# Patient Record
Sex: FEMALE | Race: WHITE | NOT HISPANIC OR LATINO | Employment: OTHER | URBAN - METROPOLITAN AREA
[De-identification: names, ages, dates, MRNs, and addresses within clinical notes are randomized per-mention and may not be internally consistent; named-entity substitution may affect disease eponyms.]

---

## 2020-08-26 LAB
ALT SERPL-CCNC: 14 U/L (ref 6–29)
AST SERPL-CCNC: 22 U/L (ref 10–35)
CHOLESTEROL (EXTERNAL): 218 MG/DL
CREATININE (EXTERNAL): 0.79 MG/DL (ref 0.5–0.99)
GFR ESTIMATED (EXTERNAL): 76 ML/MIN/1.73M2
GFR ESTIMATED (IF AFRICAN AMERICAN) (EXTERNAL): 89 ML/MIN/1.73M2
GLUCOSE (EXTERNAL): 87 MG/DL (ref 65–99)
HBA1C MFR BLD: 5.3 %
HDLC SERPL-MCNC: 83 MG/DL
LDL CHOLESTEROL (EXTERNAL): 120 MG/DL
NON HDL CHOLESTEROL (EXTERNAL): 135 MG/DL
POTASSIUM (EXTERNAL): 4.4 MMOL/L (ref 3.5–5.3)
TRIGLYCERIDES (EXTERNAL): 54 MG/DL
TSH SERPL-ACNC: 5.03 MIU/L (ref 0.4–4.5)

## 2020-09-15 ENCOUNTER — TRANSFERRED RECORDS (OUTPATIENT)
Dept: HEALTH INFORMATION MANAGEMENT | Facility: CLINIC | Age: 70
End: 2020-09-15

## 2021-07-21 ENCOUNTER — TRANSFERRED RECORDS (OUTPATIENT)
Dept: HEALTH INFORMATION MANAGEMENT | Facility: CLINIC | Age: 71
End: 2021-07-21

## 2021-12-30 ENCOUNTER — TRANSFERRED RECORDS (OUTPATIENT)
Dept: HEALTH INFORMATION MANAGEMENT | Facility: CLINIC | Age: 71
End: 2021-12-30

## 2022-01-03 ENCOUNTER — TRANSFERRED RECORDS (OUTPATIENT)
Dept: INTERNAL MEDICINE | Facility: CLINIC | Age: 72
End: 2022-01-03

## 2022-02-16 ENCOUNTER — TRANSFERRED RECORDS (OUTPATIENT)
Dept: HEALTH INFORMATION MANAGEMENT | Facility: CLINIC | Age: 72
End: 2022-02-16

## 2022-02-16 LAB
ALT SERPL-CCNC: 19 U/L (ref 6–29)
AST SERPL-CCNC: 18 U/L (ref 10–35)
CHOLESTEROL (EXTERNAL): 214 MG/DL
CREATININE (EXTERNAL): 0.75 MG/DL (ref 0.6–0.93)
GFR ESTIMATED (EXTERNAL): 80 ML/MIN/1.73M2
GFR ESTIMATED (IF AFRICAN AMERICAN) (EXTERNAL): 93 ML/MIN/1.73M2
GLUCOSE (EXTERNAL): 76 MG/DL (ref 65–99)
HDLC SERPL-MCNC: 91 MG/DL
LDL CHOLESTEROL (EXTERNAL): 103 MG/DL
NON HDL CHOLESTEROL (EXTERNAL): 123 MG/DL
POTASSIUM (EXTERNAL): 4.6 MMOL/L (ref 3.5–5.3)
TRIGLYCERIDES (EXTERNAL): 100 MG/DL
TSH SERPL-ACNC: 0.13 MIU/L (ref 0.4–4.5)

## 2022-04-08 LAB
CREATININE (EXTERNAL): 0.8 MG/DL (ref 0.6–0.93)
GFR ESTIMATED (EXTERNAL): 74 ML/MIN/1.73M2
GFR ESTIMATED (IF AFRICAN AMERICAN) (EXTERNAL): 86 ML/MIN/1.73M2
GLUCOSE (EXTERNAL): 89 MG/DL (ref 65–139)
POTASSIUM (EXTERNAL): 4.7 MMOL/L (ref 3.5–5.3)

## 2022-04-22 LAB — TSH SERPL-ACNC: 1.86 MIU/L (ref 0.4–4.5)

## 2022-05-02 ENCOUNTER — TRANSFERRED RECORDS (OUTPATIENT)
Dept: HEALTH INFORMATION MANAGEMENT | Facility: CLINIC | Age: 72
End: 2022-05-02

## 2022-05-10 ENCOUNTER — TRANSFERRED RECORDS (OUTPATIENT)
Dept: HEALTH INFORMATION MANAGEMENT | Facility: CLINIC | Age: 72
End: 2022-05-10

## 2022-05-10 LAB
ALBUMIN (URINE) MG/SPEC: <0.2 MG/DL
ALT SERPL-CCNC: 22 U/L (ref 6–29)
AST SERPL-CCNC: 26 U/L (ref 10–35)
CHOLESTEROL (EXTERNAL): 241 MG/DL
CREATININE (EXTERNAL): 0.76 MG/DL (ref 0.6–0.93)
CREATININE (URINE): 21 MG/DL (ref 20–275)
GFR ESTIMATED (EXTERNAL): 79 ML/MIN/1.73M2
GFR ESTIMATED (IF AFRICAN AMERICAN) (EXTERNAL): 91 ML/MIN/1.73M2
GLUCOSE (EXTERNAL): 82 MG/DL (ref 65–99)
HDLC SERPL-MCNC: 92 MG/DL
LDL CHOLESTEROL (EXTERNAL): 134 MG/DL
NON HDL CHOLESTEROL (EXTERNAL): 149 MG/DL
POTASSIUM (EXTERNAL): 4.6 MMOL/L (ref 3.5–5.3)
TRIGLYCERIDES (EXTERNAL): 56 MG/DL
TSH SERPL-ACNC: 4.14 MIU/L (ref 0.4–4.5)

## 2022-05-25 ENCOUNTER — TRANSFERRED RECORDS (OUTPATIENT)
Dept: HEALTH INFORMATION MANAGEMENT | Facility: CLINIC | Age: 72
End: 2022-05-25

## 2022-05-29 ENCOUNTER — HEALTH MAINTENANCE LETTER (OUTPATIENT)
Age: 72
End: 2022-05-29

## 2022-06-04 ENCOUNTER — TELEPHONE ENCOUNTER (OUTPATIENT)
Dept: URBAN - METROPOLITAN AREA CLINIC 68 | Facility: CLINIC | Age: 72
End: 2022-06-04

## 2022-06-05 ENCOUNTER — TELEPHONE ENCOUNTER (OUTPATIENT)
Dept: URBAN - METROPOLITAN AREA CLINIC 68 | Facility: CLINIC | Age: 72
End: 2022-06-05

## 2022-06-25 ENCOUNTER — TELEPHONE ENCOUNTER (OUTPATIENT)
Age: 72
End: 2022-06-25

## 2022-06-26 ENCOUNTER — TELEPHONE ENCOUNTER (OUTPATIENT)
Age: 72
End: 2022-06-26

## 2022-07-08 NOTE — TELEPHONE ENCOUNTER
FUTURE VISIT INFORMATION      FUTURE VISIT INFORMATION:    Date: 9/13/22    Time: 12:20pm    Location: [facility]  REFERRAL INFORMATION:    Reason for visit/diagnosis  [place], wants to establish care, has macular degeneration per Pt. Will bring records in    RECORDS REQUESTED FROM:       Per pt- they will hand carry records

## 2022-07-12 ASSESSMENT — ENCOUNTER SYMPTOMS
MUSCLE WEAKNESS: 0
BOWEL INCONTINENCE: 0
MUSCLE CRAMPS: 0
JOINT SWELLING: 0
EYE REDNESS: 0
JAUNDICE: 0
CONSTIPATION: 1
BACK PAIN: 1
HEARTBURN: 0
EYE IRRITATION: 0
BLOATING: 0
DOUBLE VISION: 0
ARTHRALGIAS: 1
BLOOD IN STOOL: 0
RECTAL PAIN: 0
STIFFNESS: 0
EYE WATERING: 0
DIARRHEA: 0
NAUSEA: 0
VOMITING: 0
EYE PAIN: 0
MYALGIAS: 0
ABDOMINAL PAIN: 0
NECK PAIN: 1

## 2022-07-12 ASSESSMENT — ACTIVITIES OF DAILY LIVING (ADL)
IN_THE_PAST_7_DAYS,_DID_YOU_NEED_HELP_FROM_OTHERS_TO_PERFORM_EVERYDAY_ACTIVITIES_SUCH_AS_EATING,_GETTING_DRESSED,_GROOMING,_BATHING,_WALKING,_OR_USING_THE_TOILET: N
IN_THE_PAST_7_DAYS,_DID_YOU_NEED_HELP_FROM_OTHERS_TO_TAKE_CARE_OF_THINGS_SUCH_AS_LAUNDRY_AND_HOUSEKEEPING,_BANKING,_SHOPPING,_USING_THE_TELEPHONE,_FOOD_PREPARATION,_TRANSPORTATION,_OR_TAKING_YOUR_OWN_MEDICATIONS?: N

## 2022-07-19 ENCOUNTER — OFFICE VISIT (OUTPATIENT)
Dept: INTERNAL MEDICINE | Facility: CLINIC | Age: 72
End: 2022-07-19
Payer: MEDICARE

## 2022-07-19 VITALS
BODY MASS INDEX: 23.7 KG/M2 | DIASTOLIC BLOOD PRESSURE: 91 MMHG | HEIGHT: 60 IN | SYSTOLIC BLOOD PRESSURE: 142 MMHG | HEART RATE: 67 BPM | WEIGHT: 120.7 LBS | OXYGEN SATURATION: 97 %

## 2022-07-19 DIAGNOSIS — R91.8 PULMONARY NODULES: ICD-10-CM

## 2022-07-19 DIAGNOSIS — E03.9 HYPOTHYROIDISM, UNSPECIFIED TYPE: Primary | ICD-10-CM

## 2022-07-19 DIAGNOSIS — R03.0 ELEVATED BP WITHOUT DIAGNOSIS OF HYPERTENSION: ICD-10-CM

## 2022-07-19 DIAGNOSIS — E04.1 THYROID NODULE: ICD-10-CM

## 2022-07-19 DIAGNOSIS — Z71.84 TRAVEL ADVICE ENCOUNTER: ICD-10-CM

## 2022-07-19 DIAGNOSIS — N64.4 BREAST PAIN, LEFT: ICD-10-CM

## 2022-07-19 DIAGNOSIS — M26.609 TMJ (TEMPOROMANDIBULAR JOINT SYNDROME): ICD-10-CM

## 2022-07-19 DIAGNOSIS — K21.9 GASTROESOPHAGEAL REFLUX DISEASE WITHOUT ESOPHAGITIS: ICD-10-CM

## 2022-07-19 DIAGNOSIS — M81.0 AGE-RELATED OSTEOPOROSIS WITHOUT CURRENT PATHOLOGICAL FRACTURE: ICD-10-CM

## 2022-07-19 PROCEDURE — 99204 OFFICE O/P NEW MOD 45 MIN: CPT | Performed by: HOSPITALIST

## 2022-07-19 RX ORDER — NITROGLYCERIN 80 MG/1
1 PATCH TRANSDERMAL DAILY
COMMUNITY
End: 2023-05-16

## 2022-07-19 RX ORDER — AZITHROMYCIN 250 MG/1
TABLET, FILM COATED ORAL
Qty: 6 TABLET | Refills: 0 | Status: SHIPPED | OUTPATIENT
Start: 2022-07-19 | End: 2022-07-24

## 2022-07-19 RX ORDER — LEVOTHYROXINE SODIUM 88 UG/1
88 TABLET ORAL DAILY
COMMUNITY
End: 2023-06-13

## 2022-07-19 RX ORDER — MULTIPLE VITAMINS W/ MINERALS TAB 9MG-400MCG
1 TAB ORAL DAILY
COMMUNITY

## 2022-07-19 RX ORDER — ALENDRONATE SODIUM 70 MG/1
70 TABLET ORAL
COMMUNITY
End: 2022-08-31

## 2022-07-19 RX ORDER — MELOXICAM 7.5 MG/1
7.5 TABLET ORAL DAILY
COMMUNITY
End: 2022-11-07

## 2022-07-19 NOTE — NURSING NOTE
Marisa Vigil is a 72 year old female patient that presents today in clinic for the following:    Chief Complaint   Patient presents with     Establish Care     Questions about referrals, right hip and knee pain, colonoscopy questions, lung nodule questions     The patient's allergies and medications were reviewed as noted. A set of vitals were recorded as noted without incident. The patient does not have any other questions for the provider.    Harinder Cancino, EMT at 1:13 PM on 7/19/2022

## 2022-07-19 NOTE — PATIENT INSTRUCTIONS
- Keep your appointments with your [facility] doctors.   - Hold on check thyroid labs for about 1 month. Call [facility] lab to have an appointment.   - Continue takin levothyroxine without coffee.   - Referral to Endocrinology.  - Continue all other medications.   - May take Omeprazole or Famotidine over the counter if needed for heart burn or chest discomfort.     Follow up again in 6 month Dr. Conde

## 2022-07-24 PROBLEM — R91.8 PULMONARY NODULES: Status: ACTIVE | Noted: 2022-07-24

## 2022-07-24 PROBLEM — E04.1 THYROID NODULE: Status: ACTIVE | Noted: 2022-07-24

## 2022-07-24 PROBLEM — N64.4 BREAST PAIN, LEFT: Status: ACTIVE | Noted: 2022-07-24

## 2022-07-24 PROBLEM — M81.0 AGE-RELATED OSTEOPOROSIS WITHOUT CURRENT PATHOLOGICAL FRACTURE: Status: ACTIVE | Noted: 2022-07-24

## 2022-07-24 PROBLEM — E03.9 HYPOTHYROIDISM, UNSPECIFIED TYPE: Status: ACTIVE | Noted: 2022-07-24

## 2022-07-24 PROBLEM — M26.609 TMJ (TEMPOROMANDIBULAR JOINT SYNDROME): Status: ACTIVE | Noted: 2022-07-24

## 2022-07-24 PROBLEM — Z71.84 TRAVEL ADVICE ENCOUNTER: Status: ACTIVE | Noted: 2022-07-24

## 2022-07-24 PROBLEM — K21.9 GASTROESOPHAGEAL REFLUX DISEASE WITHOUT ESOPHAGITIS: Status: ACTIVE | Noted: 2022-07-24

## 2022-07-24 ASSESSMENT — ENCOUNTER SYMPTOMS
BACK PAIN: 1
NECK PAIN: 1
CHILLS: 0
DIARRHEA: 0
SHORTNESS OF BREATH: 0
SORE THROAT: 0
FREQUENCY: 0
NAUSEA: 0
ABDOMINAL PAIN: 0
FEVER: 0
HEARTBURN: 0
CONSTIPATION: 1
JOINT SWELLING: 0
MYALGIAS: 0
ARTHRALGIAS: 1
DYSURIA: 0

## 2022-07-25 NOTE — ASSESSMENT & PLAN NOTE
Recent lab with TSH at 4.14 on 5/11/22. Had been taking levothyroxine with coffee and just recently taking without coffee for a few days.   Continued on Levothyroxine 88mcg daily.   Plan on rechecking TSH in about 2 months.   Referral to Endocrine.

## 2022-07-25 NOTE — ASSESSMENT & PLAN NOTE
Had CT neck on 12/30/21 with note of 2-3 mm left apical nodule present. Patient has no history of smoking. CT chest was obtained on 1/3/22 showing no nodularity.   Plan on reevaluating with CT lung without contrast at about 12 months from prior CT.

## 2022-07-25 NOTE — ASSESSMENT & PLAN NOTE
Sees Dr. Stuart at [facility]. Recent lab for Vitamin D, 25-OH, D3 was 54 on 5/11/22. Continued on Alendronate 70mg weekly started on 7/17/22.   Prior hx of Prolia from 2/2018-3/2020.   Prior injection of Forteo but had facial swelling in 3/2022.

## 2022-07-25 NOTE — ASSESSMENT & PLAN NOTE
Hx of multinodules on thyroid. Repeat prior Endocrinologist from [place] (Dr. Mp Driver in [place], [place]) checked a US thyroid every 2 years, last was about 1 year ago. Interesting, there was not comment on patient's thyroid on recent CT neck on 12/2021.  Will refer to Endocrine to follow.

## 2022-07-25 NOTE — PROGRESS NOTES
Assessment/Plan  Problem List Items Addressed This Visit        Nervous and Auditory    Breast pain, left     Patient being followed up a [facility] in August.              Respiratory    Pulmonary nodules     Had CT neck on 12/30/21 with note of 2-3 mm left apical nodule present. Patient has no history of smoking. CT chest was obtained on 1/3/22 showing no nodularity.   Plan on reevaluating with CT lung without contrast at about 12 months from prior CT.               Digestive    Gastroesophageal reflux disease without esophagitis     Stable. Not on current medications. Hx of hiatal hernia.              Endocrine    Thyroid nodule     Hx of multinodules on thyroid. Repeat prior Endocrinologist from [place] (Dr. Mp Driver in [place], [place]) checked a US thyroid every 2 years, last was about 1 year ago. Interesting, there was not comment on patient's thyroid on recent CT neck on 12/2021.  Will refer to Endocrine to follow.            Relevant Medications    levothyroxine (SYNTHROID/LEVOTHROID) 88 MCG tablet    Other Relevant Orders    Adult Endocrinology  Referral    Hypothyroidism, unspecified type - Primary     Recent lab with TSH at 4.14 on 5/11/22. Had been taking levothyroxine with coffee and just recently taking without coffee for a few days.   Continued on Levothyroxine 88mcg daily.   Plan on rechecking TSH in about 2 months.   Referral to Endocrine.              Relevant Medications    levothyroxine (SYNTHROID/LEVOTHROID) 88 MCG tablet    Other Relevant Orders    TSH with free T4 reflex    Adult Endocrinology  Referral       Musculoskeletal and Integumentary    Age-related osteoporosis without current pathological fracture     Sees Dr. Stuart at [facility]. Recent lab for Vitamin D, 25-OH, D3 was 54 on 5/11/22. Continued on Alendronate 70mg weekly started on 7/17/22.   Prior hx of Prolia from 2/2018-3/2020.   Prior injection of Forteo but had facial swelling in 3/2022.            Relevant Medications     meloxicam (MOBIC) 7.5 MG tablet    calcium citrate and vitamin D (CITRACAL) 200-250 MG-UNIT TABS per tablet    alendronate (FOSAMAX) 70 MG tablet    TMJ (temporomandibular joint syndrome)     Continued on meloxicam 7.5mg daily.           Relevant Medications    meloxicam (MOBIC) 7.5 MG tablet       Other    Travel advice encounter     Will have Azithromycin available for respiratory symptoms developed during travel.           Relevant Medications    azithromycin (ZITHROMAX) 250 MG tablet    Elevated BP without diagnosis of hypertension     BP today is 142/91, will monitor for now.                 No results found for any visits on 07/19/22.    Health Maintenance Due   Topic Date Due     DEXA  Never done     ADVANCE CARE PLANNING  Never done     MAMMO SCREENING  Never done     COLORECTAL CANCER SCREENING  Never done     HEPATITIS C SCREENING  Never done     DTAP/TDAP/TD IMMUNIZATION (1 - Tdap) Never done     LIPID  Never done     MEDICARE ANNUAL WELLNESS VISIT  Never done     FALL RISK ASSESSMENT  Never done     PHQ-2 (once per calendar year)  Never done     COVID-19 Vaccine (4 - Booster for Pfizer series) 01/26/2022       Subjective  Patient mentions that she had moved here from [place], [place]. She had been getting some of her care from [place], [place] at [facility] for osteoporosis, and arthritis related to her left TMJ since about 2019. Patient mentions she was receiving most of her cares down in [place] for GERD with hiatal hernia, hypothyroid with multiple nodules. Mentions she does go back to [facility] in August as she has some left breast pains with swelling. Mentions a hx of right breast leak in the past but not now.     Mentions she had an AUDREY positivity in the past with joint pains and back disc disease about a year ago. Currently doing good with left jaw with meloxicam.    Does mentions that with thyroid nodules she was followed by her endocrinologist in [place] with an ultrasound every 2 years, last was about 1 year  ago.     Mentions she is being treated for osteoporosis with Alendronate.     Otherwise, she stays active and exercises during the week. She does golf. Mentions she does have family here and will be traveling with them on their trips. Upcoming trip on a cruise to [facility].       Review of Systems   Constitutional: Negative for chills and fever.   HENT: Negative for sore throat.    Respiratory: Negative for shortness of breath.    Cardiovascular: Negative for chest pain and peripheral edema.   Gastrointestinal: Positive for constipation. Negative for abdominal pain, diarrhea, heartburn and nausea.   Breasts:  Positive for tenderness. Negative for discharge.   Genitourinary: Negative for dysuria and frequency.   Musculoskeletal: Positive for arthralgias, back pain and neck pain. Negative for joint swelling and myalgias.   Skin: Negative for rash.   Psychiatric/Behavioral: Negative for mood changes.       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     rigth left arytenoid cartilage, paramedian displacement of left true vocal cord     Cataract      Gastroesophageal reflux disease without esophagitis      H/O degenerative disc disease      Hiatal hernia      Hyperlipidemia LDL goal <130      Non-toxic multinodular goiter      Osteoarthritis      Pulmonary nodules      TMJ (temporomandibular joint syndrome) 01/11/2022    right jaw       Past Surgical History:   Procedure Laterality Date     COLONOSCOPY      2005, 2014     deviated sep       EXCISION / BIOPSY BREAST / NIPPLE / DUCT  2013    right breast duct glands removed     EYE SURGERY  2020    left eyelid     HEART CATH LEFT HEART CATH  01/27/2021     LASIK  1998     FL BREAST AUGMENTATION  2014     FL FACE LIFT      partial     SURGICAL PATHOLOGY EXAM      left hip lipoma excision     WISDOM TOOTH EXTRACTION  1969       Family  History   Problem Relation Age of Onset     Breast Cancer Mother      Osteoporosis Mother      Heart Disease Father      Atrial fibrillation Father      Osteoporosis Paternal Grandmother        Social History     Tobacco Use     Smoking status: Never Smoker     Smokeless tobacco: Never Used   Substance Use Topics     Alcohol use: Yes     Alcohol/week: 1.0 standard drink     Types: 1 Standard drinks or equivalent per week     Comment: two or three times a month        Objective  BP (!) 142/91 (BP Location: Right arm, Patient Position: Sitting, Cuff Size: Adult Regular)   Pulse 67   Ht 1.524 m (5')   Wt 54.7 kg (120 lb 11.2 oz)   SpO2 97%   Breastfeeding No   BMI 23.57 kg/m    Vitals taken by Michael Conde MD    Physical Exam  Constitutional:       General: She is not in acute distress.     Appearance: She is not ill-appearing or toxic-appearing.   HENT:      Head: Normocephalic.      Mouth/Throat:      Mouth: Mucous membranes are moist.   Eyes:      Conjunctiva/sclera: Conjunctivae normal.   Cardiovascular:      Rate and Rhythm: Regular rhythm.      Heart sounds: Normal heart sounds. No murmur heard.    No friction rub. No gallop.   Pulmonary:      Effort: Pulmonary effort is normal. No respiratory distress.      Breath sounds: Normal breath sounds. No wheezing, rhonchi or rales.   Abdominal:      General: Abdomen is flat. Bowel sounds are normal. There is no distension.      Palpations: Abdomen is soft.      Tenderness: There is no abdominal tenderness.   Musculoskeletal:      Right lower leg: No edema.      Left lower leg: No edema.   Skin:     General: Skin is warm and dry.      Findings: No rash.   Neurological:      General: No focal deficit present.      Mental Status: She is alert.   Psychiatric:         Mood and Affect: Mood normal.         Thought Content: Thought content normal.         I spent greater than 50% of the 30 minutes in the visit coordinating care regarding patient's recommendations  towards establishing care    Return in about 6 months (around 1/19/2023).      Michael Conde MD  [facility] [place]

## 2022-07-29 NOTE — TELEPHONE ENCOUNTER
RECORDS RECEIVED FROM: internal /ce   DATE RECEIVED: 8/31/22    NOTES (FOR ALL VISITS) STATUS DETAILS   OFFICE NOTES from referring provider internal  Michael Conde MD   OFFICE NOTES from other specialist ce [facility]- 7/13/22 Dean Oneill [facility]- 8/24/21 [place]   2.22.21 [place]      MEDICATION LIST internal     IMAGING      DEXASCAN ce [facility]- 7/15/22      LABS     DIABETES: HBGA1C, CREATININE, FASTING LIPIDS, MICROALBUMIN URINE, POTASSIUM, TSH, T4, THYROID: TSH, T4, CBC, THYRODLONULIN, TOTAL T3, FREE T4, CALCITONIN, CEA internal /ce

## 2022-08-22 ENCOUNTER — TELEPHONE (OUTPATIENT)
Dept: INTERNAL MEDICINE | Facility: CLINIC | Age: 72
End: 2022-08-22

## 2022-08-22 NOTE — TELEPHONE ENCOUNTER
Reason for Call:  Other appointment    Detailed comments: patient wishes to cancel and not re schedule her appointment today with Elton Young at [facility].  Pos for covid; patient states no longer needed and saw another provider recently.      Thank you.    Phone Number Patient can be reached at: Other phone number:  na*    Best Time: na    Can we leave a detailed message on this number? na    Call taken on 8/22/2022 at 7:30 AM by Shayy Carrasco

## 2022-08-25 ASSESSMENT — ENCOUNTER SYMPTOMS
DIARRHEA: 0
SORE THROAT: 0
LEG PAIN: 1
NAUSEA: 0
JOINT SWELLING: 0
BLOOD IN STOOL: 0
STIFFNESS: 1
TROUBLE SWALLOWING: 0
MUSCLE CRAMPS: 0
NECK MASS: 0
LIGHT-HEADEDNESS: 0
EXERCISE INTOLERANCE: 0
JAUNDICE: 0
NECK PAIN: 0
VOMITING: 0
BLOATING: 1
RECTAL PAIN: 0
TASTE DISTURBANCE: 0
HYPERTENSION: 1
SINUS CONGESTION: 0
PALPITATIONS: 1
HOARSE VOICE: 1
BACK PAIN: 1
ORTHOPNEA: 0
SMELL DISTURBANCE: 0
BOWEL INCONTINENCE: 0
SYNCOPE: 0
ARTHRALGIAS: 1
SINUS PAIN: 0
MYALGIAS: 1
HEARTBURN: 1
HYPOTENSION: 0
ABDOMINAL PAIN: 1
CONSTIPATION: 0
SLEEP DISTURBANCES DUE TO BREATHING: 0
MUSCLE WEAKNESS: 1

## 2022-08-26 ENCOUNTER — TELEPHONE (OUTPATIENT)
Dept: PEDIATRICS | Facility: CLINIC | Age: 72
End: 2022-08-26

## 2022-08-26 ENCOUNTER — LAB (OUTPATIENT)
Dept: LAB | Facility: CLINIC | Age: 72
End: 2022-08-26
Payer: COMMERCIAL

## 2022-08-26 DIAGNOSIS — E03.9 HYPOTHYROIDISM, UNSPECIFIED TYPE: ICD-10-CM

## 2022-08-26 PROCEDURE — 36415 COLL VENOUS BLD VENIPUNCTURE: CPT

## 2022-08-26 PROCEDURE — 84443 ASSAY THYROID STIM HORMONE: CPT

## 2022-08-26 NOTE — PROGRESS NOTES
Reached out via phone to the pt on 8/26 to remind them of their visit with Dr. Lazcano.  Diagnosed with Covid on 8/17, no symptoms.  Naomie Campos      Marisa Vigil is a 72 year old female who is being evaluated via a billable video visit.        [facility]    Consulting provider: Michael Conde MD  [address] [place] [id number]    Reason for consultation: Hypothyroidism, thyroid nodule    HPI:   Marisa Vigil is a 72 year old-year-old woman who is coming here in regards to hypothyroidism, history of multinodular goiter and also been struggling with osteoporosis.    Patient concern:  Hx of multiple nodules on thyroid. Repeat prior Endocrinologist from [place] (Dr. Mp Driver in [place], [place]) checked a US thyroid every 2 years, last was about 1 year ago. Interesting, there was not comment on patient's thyroid on recent CT neck on 12/2021.  Patient had a thyroid biopsy several years ago.  This was without consequence.  Patient perceived the biopsy is very painful.    Hypothyroid since several years.  Initially diagnosed with symptoms of cold intolerance.    Due to Hashimoto's thyroiditis    Patient has been struggling with achiness all over her body.  Had problems with walking.  Had seen orthopedics, which did not reveal a clear diagnosis.  Has seen rheumatology without diagnosis.  Patient now is back to walking and is slowly improving      Thyroid medications: LT4 88 mcg daily, takes this in an a.m. on empty stomach with water food for 30 minutes    Symptoms of hypo- and hyperthyroidism:  Weight change increase; heat or cold intolerance cold; abnormal bowel movements constipation; blaoting no change in hair or skin pattern hair loss; anxiety   heart racing no; tremors no;   Symptoms in the anterior neck: dysphagia no; dysphonia no; pain no;    Exposure to radiation: no  FH of thyroid Cancer: no      Current Problem List:   Patient Active Problem List   Diagnosis     Pulmonary  nodules     Travel advice encounter     Thyroid nodule     Hypothyroidism, unspecified type     Age-related osteoporosis without current pathological fracture     TMJ (temporomandibular joint syndrome)     Gastroesophageal reflux disease without esophagitis     Breast pain, left     Elevated BP without diagnosis of hypertension         Assessment:  Hypothyroidism  Patient with some symptoms of hypothyroidism, however thyroid hormone levels have been in the normal range.  Continue for right now at L T4 88 mcg 6 days a week.  Would avoid overtreatment in this postmenopausal woman with osteoporosis    Multinodular goiter  On in office thyroid ultrasound today echo pattern is important in more 10 years with a small thyroid consistent with Hashimoto's thyroiditis, no nodules were present  Considering that patient had been followed for a number of years and had a prior biopsy this in itself is reassuring.  No further thyroid ultrasound would be indicated unless there is a significant clinical change.    Osteoporosis  Patient currently is seen at [facility] this upcoming visit in November 2022 with Dr. Avila.  She has been on Reclast Prolia, alendronate and teriparatide, she developed side effects with all of them  Patient would like to keep this appointment, however there after possibly switch and be followed by me.  This would make sense particularly if patient would be on a treatment with multiple injections.    Plan:   Follow-up with endocrinology for osteoporosis in November 2022  Overall patient can follow-up for hypothyroidism with her primary care provider, would continue on 88 mcg of levothyroxine which she takes 6 days a week and skips 1 day a week  No further thyroid ultrasound is indicated  If desired patient can follow-up with me in 6 months for osteoporosis    Simran Lazcano MD  Endocrinology and Diabetes  Telephone contact:  [facility] [place] [phone]  [facility]  [place] [phone]        Past Medical and Past Surgical History:  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     rigth left arytenoid cartilage, paramedian displacement of left true vocal cord     Cataract      Gastroesophageal reflux disease without esophagitis      H/O degenerative disc disease      Hiatal hernia      Hyperlipidemia LDL goal <130      Non-toxic multinodular goiter      Osteoarthritis      Pulmonary nodules      TMJ (temporomandibular joint syndrome) 01/11/2022    right jaw       Past Surgical History:   Procedure Laterality Date     COLONOSCOPY      2005, 2014     deviated sep       EXCISION / BIOPSY BREAST / NIPPLE / DUCT  2013    right breast duct glands removed     EYE SURGERY  2020    left eyelid     HEART CATH LEFT HEART CATH  01/27/2021    Normal     LASIK  1998     [place] BREAST AUGMENTATION  2014     [place] FACE LIFT      partial     SURGICAL PATHOLOGY EXAM      left hip lipoma excision     WISDOM TOOTH EXTRACTION  1969       Medications:   Current Outpatient Medications   Medication Sig Dispense Refill     alendronate (FOSAMAX) 70 MG tablet Take 70 mg by mouth every 7 days       calcium citrate and vitamin D (CITRACAL) 200-250 MG-UNIT TABS per tablet Take 1 tablet by mouth 2 times daily       levothyroxine (SYNTHROID/LEVOTHROID) 88 MCG tablet Take 88 mcg by mouth daily Every day but Sunday       meloxicam (MOBIC) 7.5 MG tablet Take 7.5 mg by mouth daily       multivitamin w/minerals (MULTI-VITAMIN) tablet Take 1 tablet by mouth daily       nitroGLYcerin (NITRO-DUR) 0.4 MG/HR 24 hr patch Place 1 patch onto the skin daily         Allergies:   Allergies   Allergen Reactions     Clindamycin Blisters     Vancomycin Rash     Versed [Midazolam] Dizziness     Pt reports memory issues     Forteo Other (See Comments)     Facial swelling on 3/2022       Social History      Tobacco Use     Smoking status: Never Smoker     Smokeless tobacco: Never Used   Substance Use Topics     Alcohol use: Yes     Alcohol/week: 1.0 standard drink     Types: 1 Standard drinks or equivalent per week     Comment: two or three times a month       Family History   Problem Relation Age of Onset     Breast Cancer Mother      Osteoporosis Mother      Heart Disease Father      Atrial fibrillation Father      Osteoporosis Paternal Grandmother          Physical Examination:  BP (!) 146/88 (BP Location: Left arm, Patient Position: Sitting, Cuff Size: Adult Regular)   Pulse 78   Wt 56.2 kg (123 lb 12.8 oz)   BMI 24.18 kg/m      Wt Readings from Last 4 Encounters:  07/19/22 : 54.7 kg (120 lb 11.2 oz)    General: Well appearing elderly petite woman in no distress, up and go quick  Eyes: EOMI. Sclerae and conjunctivae are clear.   HENT: No thyromegaly or mass.    Lymphatic: No cervical or supraclavicular lymphadenopathy.  Cardiovascular: RRR, with normal S1+S2 and no murmurs.   Skin: No rash or lesions. No abdominal striae.    Extremities: No peripheral edema.   Neurologic: No tremor with hands outstretched. 2+ patellar reflexes.     Labs and Studies:   Lab Results   Component Value Date    TSH 2.98 08/26/2022     1/3/2022 [place]  CT chest   Thyroid nodules not mentioned

## 2022-08-26 NOTE — TELEPHONE ENCOUNTER
Patient called stating that she contracted COVID on 8/17/22. She took a rapid COVID test this morning and saw a weak line on the test.     Patient states that all of her symptoms of COVID resolved. She has been fever free since 8/19/22.     Requested patient wear a well fitting facemask and keep her lab appointment.     Evelin Castaneda RN on 8/26/2022 at 10:06 AM

## 2022-08-27 LAB — TSH SERPL DL<=0.005 MIU/L-ACNC: 2.98 MU/L (ref 0.4–4)

## 2022-08-29 NOTE — TELEPHONE ENCOUNTER
FUTURE VISIT INFORMATION      FUTURE VISIT INFORMATION:    Date: 11/7/22    Time: 9:40am    Location: [facility]  REFERRAL INFORMATION:    Reason for visit/diagnosis  From [place], wants to establish care, has macular degeneration per Pt. Will bring records in    RECORDS REQUESTED FROM:       Per pt she will hand carry records

## 2022-08-31 ENCOUNTER — PRE VISIT (OUTPATIENT)
Dept: ENDOCRINOLOGY | Facility: CLINIC | Age: 72
End: 2022-08-31

## 2022-08-31 ENCOUNTER — OFFICE VISIT (OUTPATIENT)
Dept: ENDOCRINOLOGY | Facility: CLINIC | Age: 72
End: 2022-08-31
Attending: HOSPITALIST
Payer: MEDICARE

## 2022-08-31 VITALS
HEART RATE: 78 BPM | DIASTOLIC BLOOD PRESSURE: 88 MMHG | WEIGHT: 123.8 LBS | SYSTOLIC BLOOD PRESSURE: 146 MMHG | BODY MASS INDEX: 24.18 KG/M2

## 2022-08-31 DIAGNOSIS — E03.9 HYPOTHYROIDISM, UNSPECIFIED TYPE: ICD-10-CM

## 2022-08-31 DIAGNOSIS — E04.1 THYROID NODULE: ICD-10-CM

## 2022-08-31 PROCEDURE — 99204 OFFICE O/P NEW MOD 45 MIN: CPT | Performed by: INTERNAL MEDICINE

## 2022-08-31 ASSESSMENT — PAIN SCALES - GENERAL: PAINLEVEL: NO PAIN (0)

## 2022-08-31 NOTE — PATIENT INSTRUCTIONS
Continue on current Levothyroxine dose 88 mcg, 6 days a week, follow  up with you PCP Dr Conde in regards to hypothyroidism    No further thyroid ultrasound needed    Follow up with Dr Avila at [facility] for osteoporosis,    If desired see me in follow up for Osteoporosis after that

## 2022-08-31 NOTE — LETTER
8/31/2022       RE: Marisa Vigil  [address]   [address]  [facility] [address]     Dear Colleague,    Thank you for referring your patient, Marisa Vigil, to the [facility] [place] at [facility]. Please see a copy of my visit note below.    Reached out via phone to the pt on 8/26 to remind them of their visit with Dr. Lazcano.  Diagnosed with Covid on 8/17, no symptoms.  Naomie Campos      Marisa Vigil is a 72 year old female who is being evaluated via a billable video visit.        Endocrinology and Diabetes Clinic    Consulting provider: Michael Conde MD  [address] [place] [address]    Reason for consultation: Hypothyroidism, thyroid nodule    HPI:   Marisa Vigil is a 72 year old-year-old woman who is coming here in regards to hypothyroidism, history of multinodular goiter and also been struggling with osteoporosis.    Patient concern:  Hx of multiple nodules on thyroid. Repeat prior Endocrinologist from [place] (Dr. Mp Driver in [place], [place]) checked a US thyroid every 2 years, last was about 1 year ago. Interesting, there was not comment on patient's thyroid on recent CT neck on 12/2021.  Patient had a thyroid biopsy several years ago.  This was without consequence.  Patient perceived the biopsy is very painful.    Hypothyroid since several years.  Initially diagnosed with symptoms of cold intolerance.    Due to Hashimoto's thyroiditis    Patient has been struggling with achiness all over her body.  Had problems with walking.  Had seen orthopedics, which did not reveal a clear diagnosis.  Has seen rheumatology without diagnosis.  Patient now is back to walking and is slowly improving      Thyroid medications: LT4 88 mcg daily, takes this in an a.m. on empty stomach with water food for 30 minutes    Symptoms of hypo- and hyperthyroidism:  Weight change increase; heat or cold intolerance cold;  abnormal bowel movements constipation; blaoting no change in hair or skin pattern hair loss; anxiety   heart racing no; tremors no;   Symptoms in the anterior neck: dysphagia no; dysphonia no; pain no;    Exposure to radiation: no  FH of thyroid Cancer: no      Current Problem List:   Patient Active Problem List   Diagnosis     Pulmonary nodules     Travel advice encounter     Thyroid nodule     Hypothyroidism, unspecified type     Age-related osteoporosis without current pathological fracture     TMJ (temporomandibular joint syndrome)     Gastroesophageal reflux disease without esophagitis     Breast pain, left     Elevated BP without diagnosis of hypertension         Assessment:  Hypothyroidism  Patient with some symptoms of hypothyroidism, however thyroid hormone levels have been in the normal range.  Continue for right now at L T4 88 mcg 6 days a week.  Would avoid overtreatment in this postmenopausal woman with osteoporosis    Multinodular goiter  On in office thyroid ultrasound today echo pattern is important in more 10 years with a small thyroid consistent with Hashimoto's thyroiditis, no nodules were present  Considering that patient had been followed for a number of years and had a prior biopsy this in itself is reassuring.  No further thyroid ultrasound would be indicated unless there is a significant clinical change.    Osteoporosis  Patient currently is seen at [place] this upcoming visit in November 2022 with Dr. Avila.  She has been on Reclast Prolia, alendronate and teriparatide, she developed side effects with all of them  Patient would like to keep this appointment, however there after possibly switch and be followed by me.  This would make sense particularly if patient would be on a treatment with multiple injections.    Plan:   Follow-up with endocrinology for osteoporosis in November 2022  Overall patient can follow-up for hypothyroidism with her primary care provider, would continue on 88 mcg of  levothyroxine which she takes 6 days a week and skips 1 day a week  No further thyroid ultrasound is indicated  If desired patient can follow-up with me in 6 months for osteoporosis    Simran Lazcano MD  Endocrinology and Diabetes  Telephone contact:  [facility] [place] [phone]  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     rigth left arytenoid cartilage, paramedian displacement of left true vocal cord     Cataract      Gastroesophageal reflux disease without esophagitis      H/O degenerative disc disease      Hiatal hernia      Hyperlipidemia LDL goal <130      Non-toxic multinodular goiter      Osteoarthritis      Pulmonary nodules      TMJ (temporomandibular joint syndrome) 01/11/2022    right jaw       Past Surgical History:   Procedure Laterality Date     COLONOSCOPY      2005, 2014     deviated sep       EXCISION / BIOPSY BREAST / NIPPLE / DUCT  2013    right breast duct glands removed     EYE SURGERY  2020    left eyelid     HEART CATH LEFT HEART CATH  01/27/2021    Normal     LASIK  1998     NC BREAST AUGMENTATION  2014     NC FACE LIFT      partial     SURGICAL PATHOLOGY EXAM      left hip lipoma excision     WISDOM TOOTH EXTRACTION  1969       Medications:   Current Outpatient Medications   Medication Sig Dispense Refill     alendronate (FOSAMAX) 70 MG tablet Take 70 mg by mouth every 7 days       calcium citrate and vitamin D (CITRACAL) 200-250 MG-UNIT TABS per tablet Take 1 tablet by mouth 2 times daily       levothyroxine (SYNTHROID/LEVOTHROID) 88 MCG tablet Take 88 mcg by mouth daily Every day but Sunday       meloxicam (MOBIC) 7.5 MG tablet Take 7.5 mg by mouth daily       multivitamin w/minerals (MULTI-VITAMIN) tablet Take 1 tablet by mouth  daily       nitroGLYcerin (NITRO-DUR) 0.4 MG/HR 24 hr patch Place 1 patch onto the skin daily         Allergies:   Allergies   Allergen Reactions     Clindamycin Blisters     Vancomycin Rash     Versed [Midazolam] Dizziness     Pt reports memory issues     Forteo Other (See Comments)     Facial swelling on 3/2022       Social History     Tobacco Use     Smoking status: Never Smoker     Smokeless tobacco: Never Used   Substance Use Topics     Alcohol use: Yes     Alcohol/week: 1.0 standard drink     Types: 1 Standard drinks or equivalent per week     Comment: two or three times a month       Family History   Problem Relation Age of Onset     Breast Cancer Mother      Osteoporosis Mother      Heart Disease Father      Atrial fibrillation Father      Osteoporosis Paternal Grandmother          Physical Examination:  BP (!) 146/88 (BP Location: Left arm, Patient Position: Sitting, Cuff Size: Adult Regular)   Pulse 78   Wt 56.2 kg (123 lb 12.8 oz)   BMI 24.18 kg/m      Wt Readings from Last 4 Encounters:  07/19/22 : 54.7 kg (120 lb 11.2 oz)    General: Well appearing elderly petite woman in no distress, up and go quick  Eyes: EOMI. Sclerae and conjunctivae are clear.   HENT: No thyromegaly or mass.    Lymphatic: No cervical or supraclavicular lymphadenopathy.  Cardiovascular: RRR, with normal S1+S2 and no murmurs.   Skin: No rash or lesions. No abdominal striae.    Extremities: No peripheral edema.   Neurologic: No tremor with hands outstretched. 2+ patellar reflexes.     Labs and Studies:   Lab Results   Component Value Date    TSH 2.98 08/26/2022     1/3/2022 [place]  CT chest   Thyroid nodules not mentioned

## 2022-08-31 NOTE — NURSING NOTE
Chief Complaint   Patient presents with     Thyroid Problem     Vital signs:      BP: (!) 146/88 Pulse: 78             Weight: 56.2 kg (123 lb 12.8 oz)  Estimated body mass index is 24.18 kg/m  as calculated from the following:    Height as of 7/19/22: 1.524 m (5').    Weight as of this encounter: 56.2 kg (123 lb 12.8 oz).

## 2022-09-13 ENCOUNTER — PRE VISIT (OUTPATIENT)
Dept: OPHTHALMOLOGY | Facility: CLINIC | Age: 72
End: 2022-09-13

## 2022-09-16 ENCOUNTER — ANCILLARY PROCEDURE (OUTPATIENT)
Dept: ULTRASOUND IMAGING | Facility: CLINIC | Age: 72
End: 2022-09-16
Attending: HOSPITALIST
Payer: MEDICARE

## 2022-09-16 ENCOUNTER — LAB (OUTPATIENT)
Dept: LAB | Facility: CLINIC | Age: 72
End: 2022-09-16

## 2022-09-16 ENCOUNTER — OFFICE VISIT (OUTPATIENT)
Dept: INTERNAL MEDICINE | Facility: CLINIC | Age: 72
End: 2022-09-16
Payer: MEDICARE

## 2022-09-16 VITALS
BODY MASS INDEX: 23.66 KG/M2 | HEIGHT: 60 IN | OXYGEN SATURATION: 98 % | SYSTOLIC BLOOD PRESSURE: 153 MMHG | HEART RATE: 99 BPM | DIASTOLIC BLOOD PRESSURE: 125 MMHG | WEIGHT: 120.5 LBS

## 2022-09-16 DIAGNOSIS — R10.11 RUQ ABDOMINAL PAIN: Primary | ICD-10-CM

## 2022-09-16 DIAGNOSIS — R10.11 RUQ ABDOMINAL PAIN: ICD-10-CM

## 2022-09-16 DIAGNOSIS — R10.31 RLQ ABDOMINAL PAIN: ICD-10-CM

## 2022-09-16 DIAGNOSIS — K21.00 GASTROESOPHAGEAL REFLUX DISEASE WITH ESOPHAGITIS WITHOUT HEMORRHAGE: ICD-10-CM

## 2022-09-16 LAB
ALBUMIN SERPL BCG-MCNC: 4.7 G/DL (ref 3.5–5.2)
ALP SERPL-CCNC: 62 U/L (ref 35–104)
ALT SERPL W P-5'-P-CCNC: 15 U/L (ref 10–35)
ANION GAP SERPL CALCULATED.3IONS-SCNC: 12 MMOL/L (ref 7–15)
AST SERPL W P-5'-P-CCNC: 26 U/L (ref 10–35)
BILIRUB SERPL-MCNC: 0.7 MG/DL
BUN SERPL-MCNC: 11.6 MG/DL (ref 8–23)
CALCIUM SERPL-MCNC: 10.2 MG/DL (ref 8.8–10.2)
CHLORIDE SERPL-SCNC: 97 MMOL/L (ref 98–107)
CREAT SERPL-MCNC: 0.83 MG/DL (ref 0.51–0.95)
CRP SERPL-MCNC: <3 MG/L
DEPRECATED HCO3 PLAS-SCNC: 24 MMOL/L (ref 22–29)
ERYTHROCYTE [DISTWIDTH] IN BLOOD BY AUTOMATED COUNT: 12.4 % (ref 10–15)
GFR SERPL CREATININE-BSD FRML MDRD: 75 ML/MIN/1.73M2
GLUCOSE SERPL-MCNC: 99 MG/DL (ref 70–99)
HCT VFR BLD AUTO: 41.2 % (ref 35–47)
HGB BLD-MCNC: 13.7 G/DL (ref 11.7–15.7)
LIPASE SERPL-CCNC: 17 U/L (ref 13–60)
MCH RBC QN AUTO: 28.4 PG (ref 26.5–33)
MCHC RBC AUTO-ENTMCNC: 33.3 G/DL (ref 31.5–36.5)
MCV RBC AUTO: 86 FL (ref 78–100)
PLATELET # BLD AUTO: 268 10E3/UL (ref 150–450)
POTASSIUM SERPL-SCNC: 4.4 MMOL/L (ref 3.4–5.3)
PROT SERPL-MCNC: 7.3 G/DL (ref 6.4–8.3)
RBC # BLD AUTO: 4.82 10E6/UL (ref 3.8–5.2)
SODIUM SERPL-SCNC: 133 MMOL/L (ref 136–145)
WBC # BLD AUTO: 7.5 10E3/UL (ref 4–11)

## 2022-09-16 PROCEDURE — 86140 C-REACTIVE PROTEIN: CPT | Performed by: PATHOLOGY

## 2022-09-16 PROCEDURE — 85027 COMPLETE CBC AUTOMATED: CPT | Performed by: PATHOLOGY

## 2022-09-16 PROCEDURE — 36415 COLL VENOUS BLD VENIPUNCTURE: CPT | Performed by: PATHOLOGY

## 2022-09-16 PROCEDURE — 99214 OFFICE O/P EST MOD 30 MIN: CPT | Performed by: HOSPITALIST

## 2022-09-16 PROCEDURE — 80053 COMPREHEN METABOLIC PANEL: CPT | Performed by: PATHOLOGY

## 2022-09-16 PROCEDURE — 83690 ASSAY OF LIPASE: CPT | Performed by: PATHOLOGY

## 2022-09-16 PROCEDURE — 76705 ECHO EXAM OF ABDOMEN: CPT | Performed by: RADIOLOGY

## 2022-09-16 RX ORDER — OMEPRAZOLE 40 MG/1
40 CAPSULE, DELAYED RELEASE ORAL DAILY
Qty: 30 CAPSULE | Refills: 1 | Status: SHIPPED | OUTPATIENT
Start: 2022-09-16 | End: 2022-11-07

## 2022-09-16 RX ORDER — DICYCLOMINE HYDROCHLORIDE 10 MG/1
10 CAPSULE ORAL
Qty: 30 CAPSULE | Refills: 1 | Status: SHIPPED | OUTPATIENT
Start: 2022-09-16 | End: 2022-11-07

## 2022-09-16 ASSESSMENT — ENCOUNTER SYMPTOMS
HEARTBURN: 1
NAUSEA: 1
FREQUENCY: 0
FEVER: 0
CONSTIPATION: 0
CHILLS: 0
BACK PAIN: 1
SHORTNESS OF BREATH: 0
DYSURIA: 0
DIARRHEA: 0
ABDOMINAL PAIN: 1

## 2022-09-16 NOTE — ASSESSMENT & PLAN NOTE
- Obtain US of abdomen.   - Labs for CBC, CMP, CRP, Lipase  - Start on omeprazole 40mg daily for now.   - Trial of Dicyclomine 10mg with meals for muscle spasms.   - Remain on a low fat diet for now.   - May take Tylenol 650mg up to three times a day as needed.   - Will call patient about results once obtained for further plan.

## 2022-09-16 NOTE — PATIENT INSTRUCTIONS
- Obtain US of abdomen.   - Labs for CBC, CMP, CRP, Lipase  - Start on omeprazole 40mg daily for now.   - Trial of Dicyclomine 10mg with meals for muscle spasms.   - Remain on a low fat diet for now.   - May take Tylenol 650mg up to three times a day as needed.       Follow up again as needed but will let you know of results.     To schedule your Ultrasound with imaging, please call [phone]

## 2022-09-16 NOTE — PROGRESS NOTES
Assessment/Plan  Problem List Items Addressed This Visit        Nervous and Auditory    RUQ abdominal pain - Primary     - Obtain US of abdomen.   - Labs for CBC, CMP, CRP, Lipase  - Start on omeprazole 40mg daily for now.   - Trial of Dicyclomine 10mg with meals for muscle spasms.   - Remain on a low fat diet for now.   - May take Tylenol 650mg up to three times a day as needed.   - Will call patient about results once obtained for further plan.           Relevant Medications    dicyclomine (BENTYL) 10 MG capsule    Other Relevant Orders    US Abdomen Limited    Comprehensive metabolic panel    CBC with platelets    Lipase    CRP inflammation    RLQ abdominal pain     May have referred pain from RUQ pains. Obtaining US abdomen. If unrevealing, may consider CT abdomen/pelvis with contrast. Patient not reporting any diarrhea.   Trial of Dicyclomine 10mg with meals for muscle spasms.               Digestive    Gastroesophageal reflux disease with esophagitis without hemorrhage     Start on omeprazole 40mg daily for now.            Relevant Medications    omeprazole (PRILOSEC) 40 MG DR capsule          No results found for any visits on 09/16/22.    Health Maintenance Due   Topic Date Due     DEXA  Never done     ADVANCE CARE PLANNING  Never done     MAMMO SCREENING  Never done     COLORECTAL CANCER SCREENING  Never done     HEPATITIS C SCREENING  Never done     DTAP/TDAP/TD IMMUNIZATION (1 - Tdap) Never done     MEDICARE ANNUAL WELLNESS VISIT  Never done     FALL RISK ASSESSMENT  Never done     COVID-19 Vaccine (4 - Booster for Pfizer series) 01/26/2022     INFLUENZA VACCINE (1) 09/01/2022         Subjective  Patient mentions she went on her cruise and developed a COVID19 infection on 8/17. Mentions she feels she has recovered. Since her cruise however, she has been having right upper and right lower abdomen pains. She has been feeling heartburn also since her cruise trip. She did see ER at [facility] and labs for CBC,  BMP, LFT, UA were unrevealing. She denies having any fevers or diarrhea. Mentions pains are mainly dull but do become sharp at times. Had some nausea at times. Mentions also that she had right gluteal pains and saw Ortho, was told pains are likely not from her hip and was referred for physical therapy. No problems urinating. No rashes that she's noticed. This past Sunday she had no pains in the morning, she went to a game and had lots of junk food along with 2 drinks of alcohol which seemed to have worsened the pains. Has been having worsen pains after eating and felt like she's been kicked in the right lower back too. Does feel lots of bloating and has been taking gas X.       Review of Systems   Constitutional: Negative for chills and fever.   Respiratory: Negative for shortness of breath.    Cardiovascular: Negative for chest pain and peripheral edema.   Gastrointestinal: Positive for abdominal pain, heartburn and nausea. Negative for constipation and diarrhea.   Genitourinary: Negative for dysuria and frequency.   Musculoskeletal: Positive for back pain.   Skin: Negative for rash.       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     rigth left arytenoid cartilage, paramedian displacement of left true vocal cord     Cataract      Gastroesophageal reflux disease without esophagitis      H/O degenerative disc disease      Hiatal hernia      Hyperlipidemia LDL goal <130      Non-toxic multinodular goiter      Osteoarthritis      Pulmonary nodules      TMJ (temporomandibular joint syndrome) 01/11/2022    right jaw       Past Surgical History:   Procedure Laterality Date     COLONOSCOPY      2005, 2014     deviated sep       EXCISION / BIOPSY BREAST / NIPPLE / DUCT  2013    right breast duct glands removed     EYE SURGERY  2020    left eyelid     HEART CATH LEFT HEART CATH   01/27/2021    Normal     LASIK  1998     NC BREAST AUGMENTATION  2014     NC FACE LIFT      partial     SURGICAL PATHOLOGY EXAM      left hip lipoma excision     WISDOM TOOTH EXTRACTION  1969       Family History   Problem Relation Age of Onset     Breast Cancer Mother      Osteoporosis Mother      Heart Disease Father      Atrial fibrillation Father      Osteoporosis Paternal Grandmother        Social History     Tobacco Use     Smoking status: Never Smoker     Smokeless tobacco: Never Used   Substance Use Topics     Alcohol use: Yes     Alcohol/week: 1.0 standard drink     Types: 1 Standard drinks or equivalent per week     Comment: two or three times a month        Objective  BP (!) 153/125 (BP Location: Left arm, Patient Position: Sitting, Cuff Size: Adult Regular)   Pulse 99   Ht 1.524 m (5')   Wt 54.7 kg (120 lb 8 oz)   SpO2 98%   BMI 23.53 kg/m    Vitals taken by Michael Conde MD    Physical Exam  Constitutional:       General: She is in acute distress.      Appearance: She is not ill-appearing or toxic-appearing.   HENT:      Head: Normocephalic.   Eyes:      Conjunctiva/sclera: Conjunctivae normal.   Cardiovascular:      Rate and Rhythm: Regular rhythm.      Heart sounds: Normal heart sounds. No murmur heard.    No friction rub. No gallop.   Pulmonary:      Effort: Pulmonary effort is normal. No respiratory distress.      Breath sounds: Normal breath sounds. No wheezing, rhonchi or rales.   Abdominal:      General: Abdomen is flat. Bowel sounds are normal. There is no distension.      Palpations: Abdomen is soft.      Tenderness: There is abdominal tenderness. There is no guarding or rebound.      Comments: RUQ tenderness, RLQ and right inguinal tenderness. No obvious abdominal hernias present. No overlying rashes of abdomen or back.    Musculoskeletal:      Right lower leg: No edema.      Left lower leg: No edema.      Comments: Mild upper lumbar right perispinal tenderness.   Skin:      General: Skin is warm and dry.      Findings: No rash.   Neurological:      Mental Status: She is alert.   Psychiatric:         Mood and Affect: Mood normal.         Thought Content: Thought content normal.         I spent greater than 50% of the 20 minutes in the visit coordinating care regarding patient's recommendations towards right sided abdominal pain    Return if symptoms worsen or fail to improve.      Michael Conde MD  [facility] INTERNAL MEDICINE [place]

## 2022-09-16 NOTE — ASSESSMENT & PLAN NOTE
May have referred pain from RUQ pains. Obtaining US abdomen. If unrevealing, may consider CT abdomen/pelvis with contrast. Patient not reporting any diarrhea.   Trial of Dicyclomine 10mg with meals for muscle spasms.

## 2022-09-16 NOTE — NURSING NOTE
Marisa Vigil is a 72 year old female patient that presents today in clinic for the following:    Chief Complaint   Patient presents with     Back Pain     RIGHT SIDE BACK PAIN, pain in abdomen as well; depending on what pt eats, pain can increase      The patient's allergies and medications were reviewed as noted. A set of vitals were recorded as noted without incident: BP (!) 153/125 (BP Location: Left arm, Patient Position: Sitting, Cuff Size: Adult Regular)   Pulse 99   Ht 1.524 m (5')   Wt 54.7 kg (120 lb 8 oz)   SpO2 98%   BMI 23.53 kg/m  . The patient does not have any other questions for the provider.    Whitney Sow, EMT at 2:00 PM on 9/16/2022

## 2022-09-19 ENCOUNTER — MYC MEDICAL ADVICE (OUTPATIENT)
Dept: INTERNAL MEDICINE | Facility: CLINIC | Age: 72
End: 2022-09-19

## 2022-09-19 DIAGNOSIS — R10.11 RUQ ABDOMINAL PAIN: Primary | ICD-10-CM

## 2022-09-19 DIAGNOSIS — R10.31 RLQ ABDOMINAL PAIN: ICD-10-CM

## 2022-09-19 NOTE — TELEPHONE ENCOUNTER
CT abdomen from [facility] on 8/21/22 was normal. If she is feeling better, we can cancel the appointment for her CT scan ordered.       Michael Conde MD  Internal Medicine  Primary Care Clinic, [place], [place]

## 2022-10-03 ENCOUNTER — HEALTH MAINTENANCE LETTER (OUTPATIENT)
Age: 72
End: 2022-10-03

## 2022-11-03 ENCOUNTER — TRANSFERRED RECORDS (OUTPATIENT)
Dept: HEALTH INFORMATION MANAGEMENT | Facility: CLINIC | Age: 72
End: 2022-11-03

## 2022-11-07 ENCOUNTER — PRE VISIT (OUTPATIENT)
Dept: OPHTHALMOLOGY | Facility: CLINIC | Age: 72
End: 2022-11-07

## 2022-11-07 ENCOUNTER — OFFICE VISIT (OUTPATIENT)
Dept: OPHTHALMOLOGY | Facility: CLINIC | Age: 72
End: 2022-11-07
Payer: MEDICARE

## 2022-11-07 DIAGNOSIS — Z98.890 S/P LASIK (LASER ASSISTED IN SITU KERATOMILEUSIS): ICD-10-CM

## 2022-11-07 DIAGNOSIS — H02.883 MEIBOMIAN GLAND DYSFUNCTION (MGD) OF BOTH EYES: ICD-10-CM

## 2022-11-07 DIAGNOSIS — H25.13 NUCLEAR SCLEROTIC CATARACT OF BOTH EYES: Primary | ICD-10-CM

## 2022-11-07 DIAGNOSIS — H02.886 MEIBOMIAN GLAND DYSFUNCTION (MGD) OF BOTH EYES: ICD-10-CM

## 2022-11-07 DIAGNOSIS — H52.13 MYOPIA, BILATERAL: ICD-10-CM

## 2022-11-07 PROCEDURE — 92014 COMPRE OPH EXAM EST PT 1/>: CPT | Performed by: OPHTHALMOLOGY

## 2022-11-07 PROCEDURE — 92015 DETERMINE REFRACTIVE STATE: CPT | Mod: GY | Performed by: OPHTHALMOLOGY

## 2022-11-07 RX ORDER — NEOMYCIN SULFATE, POLYMYXIN B SULFATE, AND DEXAMETHASONE 3.5; 10000; 1 MG/G; [USP'U]/G; MG/G
0.5 OINTMENT OPHTHALMIC AT BEDTIME
Qty: 3.5 G | Refills: 3 | Status: SHIPPED | OUTPATIENT
Start: 2022-11-07 | End: 2022-11-14

## 2022-11-07 ASSESSMENT — CONF VISUAL FIELD
OS_INFERIOR_NASAL_RESTRICTION: 0
OS_SUPERIOR_NASAL_RESTRICTION: 0
OS_NORMAL: 1
OD_SUPERIOR_TEMPORAL_RESTRICTION: 0
OS_INFERIOR_TEMPORAL_RESTRICTION: 0
OD_SUPERIOR_NASAL_RESTRICTION: 0
OD_INFERIOR_NASAL_RESTRICTION: 0
OS_SUPERIOR_TEMPORAL_RESTRICTION: 0
OD_NORMAL: 1
OD_INFERIOR_TEMPORAL_RESTRICTION: 0
METHOD: COUNTING FINGERS

## 2022-11-07 ASSESSMENT — VISUAL ACUITY
OS_CC: 20/20
OD_CC: 20/20
CORRECTION_TYPE: GLASSES
METHOD: SNELLEN - LINEAR
OS_CC+: -3

## 2022-11-07 ASSESSMENT — REFRACTION_WEARINGRX
OS_CYLINDER: SPHERE
OS_SPHERE: -2.00
OD_CYLINDER: +0.25
OD_AXIS: 178
OD_ADD: +2.50
OS_ADD: +2.50
OD_SPHERE: -1.25

## 2022-11-07 ASSESSMENT — SLIT LAMP EXAM - LIDS
COMMENTS: MEIBOMIAN GLAND DYSFUNCTION
COMMENTS: MEIBOMIAN GLAND DYSFUNCTION

## 2022-11-07 ASSESSMENT — REFRACTION_MANIFEST
OD_SPHERE: -1.25
OS_SPHERE: -1.75
OD_AXIS: 178
OS_ADD: +2.50
OD_CYLINDER: +0.50
OD_ADD: +2.50
OS_CYLINDER: SPHERE

## 2022-11-07 ASSESSMENT — EXTERNAL EXAM - LEFT EYE: OS_EXAM: NORMAL

## 2022-11-07 ASSESSMENT — TONOMETRY
IOP_METHOD: ICARE
OS_IOP_MMHG: 11
OD_IOP_MMHG: 11

## 2022-11-07 ASSESSMENT — CUP TO DISC RATIO
OS_RATIO: 0.25
OD_RATIO: 0.3

## 2022-11-07 ASSESSMENT — EXTERNAL EXAM - RIGHT EYE: OD_EXAM: NORMAL

## 2022-11-07 NOTE — NURSING NOTE
Chief Complaints and History of Present Illnesses   Patient presents with     Annual Eye Exam     Chief Complaint(s) and History of Present Illness(es)     Annual Eye Exam            Laterality: both eyes    Associated symptoms: Negative for dryness, eye pain, flashes and floaters    Pain scale: 0/10          Comments    Patient present to establish care. Patient states lids bug her but since using lid scrubs they have improved. Patient denies vision issues at this time. Patient concerned about cataracts.     MARIE Robison November 7, 2022 9:44 AM

## 2022-11-07 NOTE — PROGRESS NOTES
HPI  Marisa Vigil is a 72 year old female here for comprehensive eye exam.  Recently moved to [place].  Vision is stable.  She is wondering how her cataracts are doing.  She is using lid scrubs for eye irritation.       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     rigth left arytenoid cartilage, paramedian displacement of left true vocal cord     Cataract      Gastroesophageal reflux disease without esophagitis      H/O degenerative disc disease      Hiatal hernia      Hyperlipidemia LDL goal <130      Non-toxic multinodular goiter      Osteoarthritis      Pulmonary nodules      TMJ (temporomandibular joint syndrome) 01/11/2022    right jaw      POH: Glasses for myopia, then laser in situ keratomileusis (LASIK) both eyes Monovision  no trauma  Left eyelid chalazion excision  Bilateral lower eyelids blephararoplasty  Meibomian gland dysfunction     Oc Meds: maxitrol ointment q bedtime pulsed in the past   Refresh daily as needed   FH: father had glaucoma, father had age related macular degeneration, or other known eye diseases         Assessment & Plan        1. Nuclear sclerotic cataract of both eyes - Both Eyes    2. Myopia, bilateral - Both Eyes    3. Meibomian gland dysfunction (MGD) of both eyes - Both Eyes    4. S/P LASIK (laser assisted in situ keratomileusis) - Both Eyes       (H52.13) Myopia, bilateral - Both Eyes (primary encounter diagnosis)  Comment: mild change , shift status-post laser in situ keratomileusis (LASIK) with mini monovision  Plan: Refraction done and prescription for glasses given     Cataracts with early visual significance, counseled patient that it could cause mild glare/halos and refractive changes over time that can be compensated with new glasses   Plan: new prescription given, observe      Add warm compresses prior to lid scrubs for  Meibomian gland dysfunction.    -----------------------------------------------------------------------------------       Patient disposition:   Return in about 1 year (around 11/7/2023) for Comprehensive Exam. or patient to call sooner as needed.      Complete documentation of historical and exam elements from today's encounter can be found in the full encounter summary report (not reduplicated in this progress note). I personally obtained the chief complaint(s) and history of present illness.  I have confirmed and edited as necessary the CC, HPI, PMH/PSH, social history, FMH, ROS, and exam/neuro findings as obtained by the technician or others. I have examined this patient myself and I personally viewed the image(s) and studies listed above and the documentation reflects my findings and interpretation.  I formulated and edited as necessary the assessment and plan and discussed the findings and management plan with the patient and family.     Diya Chávez MD

## 2022-11-17 ENCOUNTER — TRANSFERRED RECORDS (OUTPATIENT)
Dept: HEALTH INFORMATION MANAGEMENT | Facility: CLINIC | Age: 72
End: 2022-11-17

## 2022-12-01 ENCOUNTER — TRANSFERRED RECORDS (OUTPATIENT)
Dept: HEALTH INFORMATION MANAGEMENT | Facility: CLINIC | Age: 72
End: 2022-12-01

## 2022-12-12 ENCOUNTER — TRANSFERRED RECORDS (OUTPATIENT)
Dept: HEALTH INFORMATION MANAGEMENT | Facility: CLINIC | Age: 72
End: 2022-12-12

## 2023-01-09 ENCOUNTER — TELEPHONE (OUTPATIENT)
Dept: PEDIATRICS | Facility: CLINIC | Age: 73
End: 2023-01-09

## 2023-01-09 ENCOUNTER — OFFICE VISIT (OUTPATIENT)
Dept: PEDIATRICS | Facility: CLINIC | Age: 73
End: 2023-01-09
Payer: MEDICARE

## 2023-01-09 ENCOUNTER — OFFICE VISIT (OUTPATIENT)
Dept: PEDIATRICS | Facility: CLINIC | Age: 73
End: 2023-01-09
Attending: INTERNAL MEDICINE
Payer: MEDICARE

## 2023-01-09 ENCOUNTER — HOSPITAL ENCOUNTER (OUTPATIENT)
Dept: ULTRASOUND IMAGING | Facility: CLINIC | Age: 73
Discharge: HOME | End: 2023-01-09
Attending: PHYSICIAN ASSISTANT | Admitting: PHYSICIAN ASSISTANT
Payer: MEDICARE

## 2023-01-09 VITALS
WEIGHT: 124 LBS | BODY MASS INDEX: 24.22 KG/M2 | OXYGEN SATURATION: 98 % | DIASTOLIC BLOOD PRESSURE: 94 MMHG | SYSTOLIC BLOOD PRESSURE: 176 MMHG | HEART RATE: 69 BPM | RESPIRATION RATE: 18 BRPM | TEMPERATURE: 97.3 F

## 2023-01-09 VITALS
TEMPERATURE: 97.9 F | DIASTOLIC BLOOD PRESSURE: 72 MMHG | SYSTOLIC BLOOD PRESSURE: 140 MMHG | BODY MASS INDEX: 24.35 KG/M2 | WEIGHT: 124 LBS | RESPIRATION RATE: 16 BRPM | OXYGEN SATURATION: 99 % | HEART RATE: 74 BPM | HEIGHT: 60 IN

## 2023-01-09 DIAGNOSIS — R19.5 CLAY-COLORED STOOLS: ICD-10-CM

## 2023-01-09 DIAGNOSIS — R10.11 RUQ ABDOMINAL PAIN: Primary | ICD-10-CM

## 2023-01-09 DIAGNOSIS — Z87.442 HISTORY OF KIDNEY STONES: ICD-10-CM

## 2023-01-09 DIAGNOSIS — R10.13 EPIGASTRIC PAIN: Primary | ICD-10-CM

## 2023-01-09 DIAGNOSIS — R10.11 RUQ ABDOMINAL PAIN: ICD-10-CM

## 2023-01-09 LAB
ALBUMIN SERPL BCG-MCNC: 4.8 G/DL (ref 3.5–5.2)
ALBUMIN UR-MCNC: NEGATIVE MG/DL
ALP SERPL-CCNC: 65 U/L (ref 35–104)
ALT SERPL W P-5'-P-CCNC: 22 U/L (ref 10–35)
ANION GAP SERPL CALCULATED.3IONS-SCNC: 13 MMOL/L (ref 7–15)
APPEARANCE UR: CLEAR
AST SERPL W P-5'-P-CCNC: 30 U/L (ref 10–35)
BASOPHILS # BLD AUTO: 0 10E3/UL (ref 0–0.2)
BASOPHILS NFR BLD AUTO: 1 %
BILIRUB SERPL-MCNC: 0.9 MG/DL
BILIRUB UR QL STRIP: NEGATIVE
BUN SERPL-MCNC: 10.3 MG/DL (ref 8–23)
CALCIUM SERPL-MCNC: 9.6 MG/DL (ref 8.8–10.2)
CHLORIDE SERPL-SCNC: 100 MMOL/L (ref 98–107)
COLOR UR AUTO: ABNORMAL
CREAT SERPL-MCNC: 0.79 MG/DL (ref 0.51–0.95)
CRP SERPL-MCNC: <3 MG/L
DEPRECATED HCO3 PLAS-SCNC: 25 MMOL/L (ref 22–29)
EOSINOPHIL # BLD AUTO: 0 10E3/UL (ref 0–0.7)
EOSINOPHIL NFR BLD AUTO: 1 %
ERYTHROCYTE [DISTWIDTH] IN BLOOD BY AUTOMATED COUNT: 12.8 % (ref 10–15)
ERYTHROCYTE [SEDIMENTATION RATE] IN BLOOD BY WESTERGREN METHOD: 6 MM/HR (ref 0–30)
GFR SERPL CREATININE-BSD FRML MDRD: 80 ML/MIN/1.73M2
GLUCOSE SERPL-MCNC: 96 MG/DL (ref 70–99)
GLUCOSE UR STRIP-MCNC: NEGATIVE MG/DL
HCT VFR BLD AUTO: 43.7 % (ref 35–47)
HGB BLD-MCNC: 13.7 G/DL (ref 11.7–15.7)
HGB UR QL STRIP: NEGATIVE
IMM GRANULOCYTES # BLD: 0 10E3/UL
IMM GRANULOCYTES NFR BLD: 0 %
KETONES UR STRIP-MCNC: 10 MG/DL
LEUKOCYTE ESTERASE UR QL STRIP: NEGATIVE
LIPASE SERPL-CCNC: 17 U/L (ref 13–60)
LYMPHOCYTES # BLD AUTO: 2.5 10E3/UL (ref 0.8–5.3)
LYMPHOCYTES NFR BLD AUTO: 30 %
MCH RBC QN AUTO: 28.5 PG (ref 26.5–33)
MCHC RBC AUTO-ENTMCNC: 31.4 G/DL (ref 31.5–36.5)
MCV RBC AUTO: 91 FL (ref 78–100)
MONOCYTES # BLD AUTO: 0.5 10E3/UL (ref 0–1.3)
MONOCYTES NFR BLD AUTO: 6 %
NEUTROPHILS # BLD AUTO: 5.3 10E3/UL (ref 1.6–8.3)
NEUTROPHILS NFR BLD AUTO: 62 %
NITRATE UR QL: NEGATIVE
NRBC # BLD AUTO: 0 10E3/UL
NRBC BLD AUTO-RTO: 0 /100
PH UR STRIP: 5.5 [PH] (ref 5–7)
PLATELET # BLD AUTO: 264 10E3/UL (ref 150–450)
POTASSIUM SERPL-SCNC: 4 MMOL/L (ref 3.4–5.3)
PROT SERPL-MCNC: 7.3 G/DL (ref 6.4–8.3)
RBC # BLD AUTO: 4.81 10E6/UL (ref 3.8–5.2)
RBC URINE: 0 /HPF
SODIUM SERPL-SCNC: 138 MMOL/L (ref 136–145)
SP GR UR STRIP: 1.01 (ref 1–1.03)
SQUAMOUS EPITHELIAL: <1 /HPF
UROBILINOGEN UR STRIP-MCNC: NORMAL MG/DL
WBC # BLD AUTO: 8.4 10E3/UL (ref 4–11)
WBC URINE: <1 /HPF

## 2023-01-09 PROCEDURE — 76700 US EXAM ABDOM COMPLETE: CPT

## 2023-01-09 PROCEDURE — 99214 OFFICE O/P EST MOD 30 MIN: CPT | Performed by: INTERNAL MEDICINE

## 2023-01-09 PROCEDURE — 36415 COLL VENOUS BLD VENIPUNCTURE: CPT | Performed by: PHYSICIAN ASSISTANT

## 2023-01-09 PROCEDURE — 83690 ASSAY OF LIPASE: CPT | Performed by: PHYSICIAN ASSISTANT

## 2023-01-09 PROCEDURE — 86140 C-REACTIVE PROTEIN: CPT | Performed by: PHYSICIAN ASSISTANT

## 2023-01-09 PROCEDURE — 81001 URINALYSIS AUTO W/SCOPE: CPT | Performed by: PHYSICIAN ASSISTANT

## 2023-01-09 PROCEDURE — 85652 RBC SED RATE AUTOMATED: CPT | Performed by: PHYSICIAN ASSISTANT

## 2023-01-09 PROCEDURE — 80053 COMPREHEN METABOLIC PANEL: CPT | Performed by: PHYSICIAN ASSISTANT

## 2023-01-09 PROCEDURE — 85025 COMPLETE CBC W/AUTO DIFF WBC: CPT | Performed by: PHYSICIAN ASSISTANT

## 2023-01-09 PROCEDURE — 99203 OFFICE O/P NEW LOW 30 MIN: CPT | Performed by: PHYSICIAN ASSISTANT

## 2023-01-09 ASSESSMENT — PAIN SCALES - GENERAL: PAINLEVEL: MILD PAIN (3)

## 2023-01-09 NOTE — RESULT ENCOUNTER NOTE
Results discussed directly with patient while patient was present. Any further details documented in the note.   Naomy Walden PA-C

## 2023-01-09 NOTE — PROGRESS NOTES
Assessment & Plan     RUQ abdominal pain  Classic presentation of biliary colic vs cholecystitis - onset last night and severe pain (8/10) lasting over an hour and subsiding on own after a large heavy meal (fried chicken, fries, and ice cream).  No nausea or vomiting.  No fever.  Pain is better, but pt is still sore in the RUQ region with tenderness on palpation.    Will send to day of diagnosis hub for imaging and labs today and referral to surgery if needed.           There are no Patient Instructions on file for this visit.    Return in about 1 week (around 1/16/2023), or if symptoms worsen or fail to improve.    Sanjay Wood MD  [facility] NOY Cunningham is a 72 year old, presenting for the following health issues:  Abdominal Pain (Gallbladder )    Patient is going out of [place] country [place] so she wants to make sure everything is okay. Pain started last night at 8pm and 8 for pain level and this morning she reports that she is feeling better. No nausea, vomiting or fever. Patient reports that her back hurt and she was bloated last night.    Could be what she ate.  Had fried chicken, french fries, and an ice cream bar.    History of Present Illness       Reason for visit:  Gallbladder  Symptom onset:  1-3 days ago  Symptoms include:  Pain R side abdomen  Symptom intensity:  Mild  Symptom progression:  Improving  Had these symptoms before:  No    She eats 2-3 servings of fruits and vegetables daily.She consumes 0 sweetened beverage(s) daily.She exercises with enough effort to increase her heart rate 30 to 60 minutes per day.  She exercises with enough effort to increase her heart rate 6 days per week.   She is taking medications regularly.     Referral to Acute and Diagnostic Services    The [facility]ostics [facility] Clinic has been contacted at [phone] ([place]) to confirm patient acceptance. The transition to Acute & Diagnostic Services Clinic has  been discussed with patient, and she agrees with next level of care.  Patient understands that evaluation/treatment at [facility] typically takes significantly longer than in clinic/urgent care (>2 hours).      Special issues:  None          Referral placed: Yes  Patient has transportation arranged and will travel to the [facility] without delay: Yes  Patient aware not to eat or drink. Yes    The following provider has assessed this patient for intervention at [facility], and directed the patient for referral: MD Sanjay Winter MD              Review of Systems   All other systems on a 10-point review are negative, unless otherwise noted in HPI        Objective    BP (!) 140/72 (BP Location: Right arm, Patient Position: Sitting, Cuff Size: Adult Regular)   Pulse 74   Temp 97.9  F (36.6  C) (Tympanic)   Resp 16   Ht 1.524 m (5')   Wt 56.2 kg (124 lb)   SpO2 99%   BMI 24.22 kg/m    Body mass index is 24.22 kg/m .  Physical Exam   GENERAL: healthy, alert and no distress  ABDOMEN: tenderness RUQ and bowel sounds normal  MS: no gross musculoskeletal defects noted, no edema

## 2023-01-09 NOTE — PROGRESS NOTES
Assessment & Plan     Epigastric pain  RUQ abdominal pain  Kahlil-colored stools  History of kidney stones  Stat ultrasound and labs reassuring against acute process.  Patient had good response to GI cocktail.  Suspect gastritis as etiology.  Recommend omeprazole 20 mg twice daily x14 days.  If symptoms worsening or not improving follow-up with general surgery to evaluate suspected biliary colic.  If incomplete resolution after completion of 2 weeks of omeprazole follow-up with PCP to discuss H. pylori testing  - Adult General Surg Referral  - Referral to Acute and Diagnostic Services (Day of diagnostic / First order acute)  - US Abdomen Complete  - Comprehensive metabolic panel  - CBC with platelets differential  - Lipase  - Erythrocyte sedimentation rate auto  - CRP inflammation  - UA with Microscopic reflex to Culture  - lidocaine (viscous) (XYLOCAINE) 2 % 15 mL, alum & mag hydroxide-simethicone (MAALOX) 15 mL GI Cocktail  - omeprazole (PRILOSEC) 20 MG DR capsule  Dispense: 28 capsule; Refill: 0              44 minutes spent on the date of the encounter doing chart review, history and exam, documentation and further activities per the note        Return in about 2 weeks (around 1/23/2023) for consultation with general surgery if no improvent or incomplete improvement.    [facility] ALONZO Cunningham is a 72 year old, presenting for the following health issues:  Abdominal Pain (RUQ pain X 2 days)      HPI     Abdominal/Flank Pain  Onset/Duration: X 2 days  Description:   Character: Stabbing  Location: right upper quadrant  Radiation: Back  Intensity: 2/10  Progression of Symptoms:  improving  Accompanying Signs & Symptoms:  Fever/Chills: no   Gas/Bloating: YES- bloating during episodic pain  Nausea: no   Vomiting: no   Diarrhea: no   Constipation: no   Dysuria or Hematuria: no   History:   Trauma: no   Previous similar pain: no   Previous tests done: no  "  Previous Abdominal Surgery: no   Precipitating factors:   Does the pain change with:     Food: no-has not eaten anything since last night's pain.  Notes her diet over the weekend was \"horrendous\"     Bowel Movement: no-but denies any BM since pain episode     Urination: no    Other factors:  no   Therapies tried and outcome: none    When did you eat last: no solids since 1/8/2022, water this morning, around 11:00 AM        Review of Systems         Objective    BP (!) 176/94 (BP Location: Left arm, Patient Position: Chair, Cuff Size: Adult Regular)   Pulse 69   Temp 97.3  F (36.3  C) (Oral)   Resp 18   Wt 56.2 kg (124 lb)   SpO2 98%   BMI 24.22 kg/m    Body mass index is 24.22 kg/m .  Physical Exam   GENERAL: healthy, alert and no distress  EYES: Eyes grossly normal to inspection, PERRL and conjunctivae and sclerae normal  RESP: lungs clear to auscultation - no rales, rhonchi or wheezes  CV: regular rate and rhythm, normal S1 S2, no S3 or S4, no murmur, click or rub, no peripheral edema and peripheral pulses strong  ABDOMEN: soft, epigastric, left upper quadrant, and right upper quadrant tenderness without guarding -improved after GI cocktail, no hepatosplenomegaly, no masses and bowel sounds normal  MS: no gross musculoskeletal defects noted, no edema  SKIN: no suspicious lesions or rashes  NEURO: Normal strength and tone, mentation intact and speech normal  PSYCH: mentation appears normal, affect normal/bright    Results for orders placed or performed in visit on 01/09/23 (from the past 24 hour(s))   Comprehensive metabolic panel   Result Value Ref Range    Sodium 138 136 - 145 mmol/L    Potassium 4.0 3.4 - 5.3 mmol/L    Chloride 100 98 - 107 mmol/L    Carbon Dioxide (CO2) 25 22 - 29 mmol/L    Anion Gap 13 7 - 15 mmol/L    Urea Nitrogen 10.3 8.0 - 23.0 mg/dL    Creatinine 0.79 0.51 - 0.95 mg/dL    Calcium 9.6 8.8 - 10.2 mg/dL    Glucose 96 70 - 99 mg/dL    Alkaline Phosphatase 65 35 - 104 U/L    AST 30 10 " - 35 U/L    ALT 22 10 - 35 U/L    Protein Total 7.3 6.4 - 8.3 g/dL    Albumin 4.8 3.5 - 5.2 g/dL    Bilirubin Total 0.9 <=1.2 mg/dL    GFR Estimate 80 >60 mL/min/1.73m2   CBC with platelets differential    Narrative    The following orders were created for panel order CBC with platelets differential.  Procedure                               Abnormality         Status                     ---------                               -----------         ------                     CBC with platelets and d...[[id number]]  Abnormal            Final result                 Please view results for these tests on the individual orders.   Lipase   Result Value Ref Range    Lipase 17 13 - 60 U/L   Erythrocyte sedimentation rate auto   Result Value Ref Range    Erythrocyte Sedimentation Rate 6 0 - 30 mm/hr   CRP inflammation   Result Value Ref Range    CRP Inflammation <3.00 <5.00 mg/L   CBC with platelets and differential   Result Value Ref Range    WBC Count 8.4 4.0 - 11.0 10e3/uL    RBC Count 4.81 3.80 - 5.20 10e6/uL    Hemoglobin 13.7 11.7 - 15.7 g/dL    Hematocrit 43.7 35.0 - 47.0 %    MCV 91 78 - 100 fL    MCH 28.5 26.5 - 33.0 pg    MCHC 31.4 (L) 31.5 - 36.5 g/dL    RDW 12.8 10.0 - 15.0 %    Platelet Count 264 150 - 450 10e3/uL    % Neutrophils 62 %    % Lymphocytes 30 %    % Monocytes 6 %    % Eosinophils 1 %    % Basophils 1 %    % Immature Granulocytes 0 %    NRBCs per 100 WBC 0 <1 /100    Absolute Neutrophils 5.3 1.6 - 8.3 10e3/uL    Absolute Lymphocytes 2.5 0.8 - 5.3 10e3/uL    Absolute Monocytes 0.5 0.0 - 1.3 10e3/uL    Absolute Eosinophils 0.0 0.0 - 0.7 10e3/uL    Absolute Basophils 0.0 0.0 - 0.2 10e3/uL    Absolute Immature Granulocytes 0.0 <=0.4 10e3/uL    Absolute NRBCs 0.0 10e3/uL   UA with Microscopic reflex to Culture    Specimen: Urine, Clean Catch   Result Value Ref Range    Color Urine Straw Colorless, Straw, Light Yellow, Yellow    Appearance Urine Clear Clear    Glucose Urine Negative Negative mg/dL     Bilirubin Urine Negative Negative    Ketones Urine 10 (A) Negative mg/dL    Specific Gravity Urine 1.006 1.003 - 1.035    Blood Urine Negative Negative    pH Urine 5.5 5.0 - 7.0    Protein Albumin Urine Negative Negative mg/dL    Urobilinogen Urine Normal Normal, 2.0 mg/dL    Nitrite Urine Negative Negative    Leukocyte Esterase Urine Negative Negative    RBC Urine 0 <=2 /HPF    WBC Urine <1 <=5 /HPF    Squamous Epithelials Urine <1 <=1 /HPF    Narrative    Urine Culture not indicated     Recent Results (from the past 744 hour(s))   US Abdomen Complete    Narrative    US ABDOMEN COMPLETE 1/9/2023 3:32 PM    CLINICAL HISTORY: Right upper quadrant pain.  TECHNIQUE: Complete abdominal ultrasound.  COMPARISON: None.    FINDINGS: The exam was somewhat limited related to prominent overlying  bowel gas.    GALLBLADDER: Unremarkable. No gallstones, wall thickening, or  pericholecystic fluid. Negative sonographic Chong's sign.    BILE DUCTS: No biliary dilatation. The common duct measures 2 mm.  Portions of the common duct could not be visualized due to overlying  bowel gas.    LIVER: Normal parenchyma with smooth contour. No focal hepatic lesions  are identified.    RIGHT KIDNEY: Normal size. Normal echogenicity with no hydronephrosis  or mass.     LEFT KIDNEY: Normal size. Normal echogenicity with no hydronephrosis  or mass.     SPLEEN: Normal.    PANCREAS: The visualized portions are normal.    AORTA: Normal caliber where seen.     IVC: Normal where visualized.    No ascites.      Impression    IMPRESSION:  Unremarkable complete abdominal ultrasound. No cause for right upper  quadrant pain is identified.    DEION HAYES MD         SYSTEM ID:  [id number]

## 2023-01-17 ENCOUNTER — OFFICE VISIT (OUTPATIENT)
Dept: INTERNAL MEDICINE | Facility: CLINIC | Age: 73
End: 2023-01-17
Payer: MEDICARE

## 2023-01-17 VITALS
DIASTOLIC BLOOD PRESSURE: 88 MMHG | WEIGHT: 123.8 LBS | BODY MASS INDEX: 24.3 KG/M2 | OXYGEN SATURATION: 99 % | HEIGHT: 60 IN | HEART RATE: 72 BPM | SYSTOLIC BLOOD PRESSURE: 137 MMHG

## 2023-01-17 DIAGNOSIS — L98.9 SKIN LESION: ICD-10-CM

## 2023-01-17 DIAGNOSIS — R91.8 PULMONARY NODULES: ICD-10-CM

## 2023-01-17 DIAGNOSIS — R10.9 RIGHT SIDED ABDOMINAL PAIN: Primary | ICD-10-CM

## 2023-01-17 DIAGNOSIS — M81.0 AGE-RELATED OSTEOPOROSIS WITHOUT CURRENT PATHOLOGICAL FRACTURE: ICD-10-CM

## 2023-01-17 DIAGNOSIS — M62.830 BACK MUSCLE SPASM: ICD-10-CM

## 2023-01-17 DIAGNOSIS — A09 TRAVELER'S DIARRHEA: ICD-10-CM

## 2023-01-17 DIAGNOSIS — E04.1 THYROID NODULE: ICD-10-CM

## 2023-01-17 PROBLEM — K21.00 GASTROESOPHAGEAL REFLUX DISEASE WITH ESOPHAGITIS WITHOUT HEMORRHAGE: Status: RESOLVED | Noted: 2022-09-16 | Resolved: 2023-01-17

## 2023-01-17 PROCEDURE — 99214 OFFICE O/P EST MOD 30 MIN: CPT | Performed by: HOSPITALIST

## 2023-01-17 RX ORDER — AZITHROMYCIN 250 MG/1
TABLET, FILM COATED ORAL
Qty: 6 TABLET | Refills: 0 | Status: SHIPPED | OUTPATIENT
Start: 2023-01-17 | End: 2023-01-22

## 2023-01-17 RX ORDER — METHOCARBAMOL 500 MG/1
500 TABLET, FILM COATED ORAL
Qty: 30 TABLET | Refills: 1 | Status: SHIPPED | OUTPATIENT
Start: 2023-01-17 | End: 2023-07-24

## 2023-01-17 RX ORDER — DICYCLOMINE HYDROCHLORIDE 10 MG/1
10 CAPSULE ORAL
Qty: 30 CAPSULE | Refills: 0 | Status: SHIPPED | OUTPATIENT
Start: 2023-01-17 | End: 2023-06-15

## 2023-01-17 ASSESSMENT — ENCOUNTER SYMPTOMS
ARTHRALGIAS: 0
SHORTNESS OF BREATH: 0
DYSURIA: 0
BACK PAIN: 1
CHILLS: 0
COLOR CHANGE: 1
DIARRHEA: 0
CONSTIPATION: 0
FEVER: 0
ABDOMINAL PAIN: 1
NECK PAIN: 1

## 2023-01-17 NOTE — PATIENT INSTRUCTIONS
- Take Omeprazole 40mg daily for 2 weeks, then as needed.   - Start on Methocarbamol at night as needed (start with 1 week) for back muscle spasms.   - Dicyclomine as needed for abdominal pains.   - Continue exercises for back per Physical therapy.  - Will send Azithromycin to start if diarrhea for 3 days.   - CT chest to repeat to reevaluate lung nodule.   - Revisit with [facility] about neck.  - Keep dermatology appointment tomorrow.      Follow up again in 5 months. Message Dr. Conde prior to visit for labs you should obtain.    To schedule a CT, please call radiology scheduling at [phone].

## 2023-01-17 NOTE — NURSING NOTE
Marisa Vigil is a 72 year old female patient that presents today in clinic for the following:    Chief Complaint   Patient presents with     RECHECK     Lung nodule, gastritis, muscle spasms in back     The patient's allergies and medications were reviewed as noted. A set of vitals were recorded as noted without incident. The patient does not have any other questions for the provider.    Harinder Cancino, EMT at 11:28 AM on 1/17/2023

## 2023-01-17 NOTE — ASSESSMENT & PLAN NOTE
Patient will be travelling soon. Will send Azithromycin to take if develops diarrhea that last at least 3 days.

## 2023-01-17 NOTE — PROGRESS NOTES
Assessment/Plan  Problem List Items Addressed This Visit        Nervous and Auditory    Right sided abdominal pain - Primary     Recently had US abdomen without gallstones. Lipase, CMP, CBC, and CRP were normal.   - Continue on Omeprazole 40mg daily for 2 weeks, then as needed.   - Dicyclomine as needed for abdominal pains.          Relevant Medications    dicyclomine (BENTYL) 10 MG capsule       Respiratory    Pulmonary nodules     CT neck on 12/30/21 with note of 2-3 mm left apical nodule present. Patient has no history of smoking. CT chest was obtained on 1/3/22 showing no nodularity.   - Will repeat CT chest. If no nodules again, will stop monitoring.         Relevant Orders    CT Chest w/o Contrast       Digestive    Traveler's diarrhea     Patient will be travelling soon. Will send Azithromycin to take if develops diarrhea that last at least 3 days.          Relevant Medications    azithromycin (ZITHROMAX) 250 MG tablet       Endocrine    Thyroid nodule     Patient with hx of Hashimoto's thyroiditis. No longer needs US thyroid per endocrine.             Musculoskeletal and Integumentary    Age-related osteoporosis without current pathological fracture     Continue annual Reclast at [facility] in December, first dose on 12/6/22.          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 Also has cervical spondylosis with right severe neural foraminal stenosis at C4-5, moderate to severe right>left neural foraminal stenosis at C5-6 and mild to moderate b/l neural foraminal stenosis at C6-7 on MRI cervical spine on 12/30/21.   - Start on Methocarbamol at night as needed (start with 1 week) for back muscle spasms.   - Continue exercises for back per Physical therapy.  - Revisit with [facility] about neck.         Relevant Medications    methocarbamol (ROBAXIN) 500 MG tablet    Skin lesion      Small ovoid lesion on right breast with light brown discoloration present. Has hx of melanoma s/p resection.  Patient to keep appointment with dermatology tomorrow.            No results found for any visits on 01/17/23.    Health Maintenance Due   Topic Date Due     MARILU  Never done     ADVANCE CARE PLANNING  Never done     COLORECTAL CANCER SCREENING  Never done     HEPATITIS C SCREENING  Never done     DTAP/TDAP/TD IMMUNIZATION (1 - Tdap) Never done     MEDICARE ANNUAL WELLNESS VISIT  Never done     COVID-19 Vaccine (4 - Booster for Pfizer series) 11/21/2021       Subjective  Patient mentions she has been monitoring her blood pressures. The past week she shows me range of /63-83 mainly. Two readings with SBP at 150 and 149 and one reading of DBP at 101. No chest pains or breathing difficulty. Heart rates range from 41-73. Mentions since last seeing me, has had a couple lightheadedness episodes. Mentions since I last saw the patient, she had a skin lesion that turned out to be melanoma and was excised with dermatology. She mentions noticing a new lesion on her right breast recently, has a visit with dermatology tomorrow.     Mentions being seen recently for right upper quadrant pains and placed on omeprazole. She had an ultrasound of her abdomen showing now stones. Lipase and liver enzymes were well.     Mentions she has been having right lower back and neck pains. She saw [facility] orthopedics, was told she had muscle spasms to her back and has been working with physical therapy. Mentions she did take some old meloxicam 1/2 tablet of 15mg for 3 days which helps for the pains.     Lastly, mentions she was given Reclast at [place] on 12/6/23 and did well. Mentions to continue for 3 days and then give a break after for a while.       Review of Systems   Constitutional: Negative for chills and fever.   Respiratory: Negative for shortness of breath.    Cardiovascular: Negative for chest pain and peripheral edema.    Gastrointestinal: Positive for abdominal pain. Negative for constipation and diarrhea.   Genitourinary: Negative for dysuria.   Musculoskeletal: Positive for back pain and neck pain. Negative for arthralgias.   Skin: Positive for color change. Negative for rash.   Psychiatric/Behavioral: Negative for mood changes.       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     rigth left arytenoid cartilage, paramedian displacement of left true vocal cord     Cataract      Gastroesophageal reflux disease without esophagitis      H/O degenerative disc disease      Hiatal hernia      Hyperlipidemia LDL goal <130      Non-toxic multinodular goiter      Osteoarthritis      Pulmonary nodules      TMJ (temporomandibular joint syndrome) 01/11/2022    right jaw       Past Surgical History:   Procedure Laterality Date     COLONOSCOPY      2005, 2014     deviated sep       EXCISION / BIOPSY BREAST / NIPPLE / DUCT  2013    right breast duct glands removed     EYE SURGERY  2020    left eyelid     HEART CATH LEFT HEART CATH  01/27/2021    Normal     LASIK  1998     LITHOTRIPSY  2007     CO BREAST AUGMENTATION  2014     CO FACE LIFT      partial     SURGICAL PATHOLOGY EXAM      left hip lipoma excision     WISDOM TOOTH EXTRACTION  1969       Family History   Problem Relation Age of Onset     Breast Cancer Mother      Osteoporosis Mother      Macular Degeneration Father      Glaucoma Father      Heart Disease Father      Atrial fibrillation Father      Osteoporosis Paternal Grandmother        Social History     Tobacco Use     Smoking status: Never     Smokeless tobacco: Never   Substance Use Topics     Alcohol use: Yes     Alcohol/week: 1.0 standard drink     Types: 1 Standard drinks or equivalent per week     Comment: two or three times a month        Objective  /88 (BP Location: Right  arm, Patient Position: Sitting, Cuff Size: Adult Regular)   Pulse 72   Ht 1.524 m (5')   Wt 56.2 kg (123 lb 12.8 oz)   SpO2 99%   BMI 24.18 kg/m    Vitals taken by Michael Conde MD    Physical Exam  Constitutional:       General: She is not in acute distress.     Appearance: She is not ill-appearing or toxic-appearing.   HENT:      Head: Normocephalic.   Eyes:      Conjunctiva/sclera: Conjunctivae normal.   Cardiovascular:      Rate and Rhythm: Regular rhythm.      Heart sounds: Normal heart sounds. No murmur heard.    No friction rub. No gallop.   Pulmonary:      Effort: Pulmonary effort is normal. No respiratory distress.      Breath sounds: Normal breath sounds. No wheezing, rhonchi or rales.   Abdominal:      General: Bowel sounds are normal. There is no distension.      Palpations: Abdomen is soft.      Tenderness: There is abdominal tenderness. There is no guarding or rebound.      Comments: Mild RUQ tenderness   Musculoskeletal:      Right lower leg: No edema.      Left lower leg: No edema.   Skin:     General: Skin is warm and dry.      Findings: Lesion present.      Comments: Ovoid flesh appearing lesion along right breast with lateral region with light brown discoloration.   Neurological:      Mental Status: She is alert.   Psychiatric:         Mood and Affect: Mood normal.         Thought Content: Thought content normal.       25 minutes spent on the date of the encounter doing chart review, history and exam, documentation and further activities per the note.      Return in about 5 months (around 6/17/2023).      Michael Conde MD  [facility] [place]

## 2023-01-17 NOTE — ASSESSMENT & PLAN NOTE
CT neck on 12/30/21 with note of 2-3 mm left apical nodule present. Patient has no history of smoking. CT chest was obtained on 1/3/22 showing no nodularity.   - Will repeat CT chest. If no nodules again, will stop monitoring.

## 2023-01-17 NOTE — ASSESSMENT & PLAN NOTE
Small ovoid lesion on right breast with light brown discoloration present. Has hx of melanoma s/p resection.  Patient to keep appointment with dermatology tomorrow.

## 2023-01-17 NOTE — ASSESSMENT & PLAN NOTE
Recently had US abdomen without gallstones. Lipase, CMP, CBC, and CRP were normal.   - Continue on Omeprazole 40mg daily for 2 weeks, then as needed.   - Dicyclomine as needed for abdominal pains.

## 2023-01-17 NOTE — ASSESSMENT & PLAN NOTE
Does have hx of spondyostenosis at L3-4, L4-5, L5-S1 on MRI Lumbar spine on 10/23/21. Also has cervical spondylosis with right severe neural foraminal stenosis at C4-5, moderate to severe right>left neural foraminal stenosis at C5-6 and mild to moderate b/l neural foraminal stenosis at C6-7 on MRI cervical spine on 12/30/21.   - Start on Methocarbamol at night as needed (start with 1 week) for back muscle spasms.   - Continue exercises for back per Physical therapy.  - Revisit with [facility] about neck.

## 2023-01-18 ENCOUNTER — TRANSFERRED RECORDS (OUTPATIENT)
Dept: HEALTH INFORMATION MANAGEMENT | Facility: CLINIC | Age: 73
End: 2023-01-18

## 2023-01-21 ENCOUNTER — ANCILLARY PROCEDURE (OUTPATIENT)
Dept: CT IMAGING | Facility: CLINIC | Age: 73
End: 2023-01-21
Attending: HOSPITALIST
Payer: MEDICARE

## 2023-01-21 DIAGNOSIS — R91.8 PULMONARY NODULES: ICD-10-CM

## 2023-01-21 LAB — RADIOLOGIST FLAGS: NORMAL

## 2023-01-21 PROCEDURE — 71250 CT THORAX DX C-: CPT | Mod: MF | Performed by: RADIOLOGY

## 2023-01-21 PROCEDURE — G1010 CDSM STANSON: HCPCS | Performed by: RADIOLOGY

## 2023-01-23 DIAGNOSIS — M89.9 LESION OF THORACIC VERTEBRA: ICD-10-CM

## 2023-01-23 DIAGNOSIS — R91.8 PULMONARY NODULES: Primary | ICD-10-CM

## 2023-01-24 ENCOUNTER — ANCILLARY PROCEDURE (OUTPATIENT)
Dept: MRI IMAGING | Facility: CLINIC | Age: 73
End: 2023-01-24
Attending: HOSPITALIST
Payer: MEDICARE

## 2023-01-24 ENCOUNTER — MYC MEDICAL ADVICE (OUTPATIENT)
Dept: INTERNAL MEDICINE | Facility: CLINIC | Age: 73
End: 2023-01-24

## 2023-01-24 DIAGNOSIS — M89.9 LESION OF THORACIC VERTEBRA: ICD-10-CM

## 2023-01-24 PROCEDURE — 72146 MRI CHEST SPINE W/O DYE: CPT | Mod: MG | Performed by: RADIOLOGY

## 2023-01-24 PROCEDURE — G1010 CDSM STANSON: HCPCS | Mod: GC | Performed by: RADIOLOGY

## 2023-02-14 ENCOUNTER — TRANSFERRED RECORDS (OUTPATIENT)
Dept: HEALTH INFORMATION MANAGEMENT | Facility: CLINIC | Age: 73
End: 2023-02-14

## 2023-02-15 ENCOUNTER — HOSPITAL ENCOUNTER (OUTPATIENT)
Dept: NUCLEAR MEDICINE | Facility: CLINIC | Age: 73
Setting detail: NUCLEAR MEDICINE
Discharge: HOME | End: 2023-02-15
Attending: INTERNAL MEDICINE
Payer: MEDICARE

## 2023-02-15 ENCOUNTER — MYC MEDICAL ADVICE (OUTPATIENT)
Dept: INTERNAL MEDICINE | Facility: CLINIC | Age: 73
End: 2023-02-15
Payer: MEDICARE

## 2023-02-15 DIAGNOSIS — M89.9 LESION OF THORACIC VERTEBRA: ICD-10-CM

## 2023-02-15 DIAGNOSIS — A09 TRAVELER'S DIARRHEA: Primary | ICD-10-CM

## 2023-02-15 PROCEDURE — A9561 TC99M OXIDRONATE: HCPCS | Performed by: INTERNAL MEDICINE

## 2023-02-15 PROCEDURE — 343N000001 HC RX 343: Performed by: INTERNAL MEDICINE

## 2023-02-15 PROCEDURE — G1010 CDSM STANSON: HCPCS

## 2023-02-15 RX ADMIN — Medication 26 MILLICURIE: at 08:58

## 2023-02-16 RX ORDER — AZITHROMYCIN 250 MG/1
TABLET, FILM COATED ORAL
Qty: 6 TABLET | Refills: 0 | Status: SHIPPED | OUTPATIENT
Start: 2023-02-16 | End: 2023-02-21

## 2023-02-28 ENCOUNTER — TRANSFERRED RECORDS (OUTPATIENT)
Dept: HEALTH INFORMATION MANAGEMENT | Facility: CLINIC | Age: 73
End: 2023-02-28

## 2023-03-30 DIAGNOSIS — K21.00 GASTROESOPHAGEAL REFLUX DISEASE WITH ESOPHAGITIS WITHOUT HEMORRHAGE: ICD-10-CM

## 2023-03-30 RX ORDER — OMEPRAZOLE 40 MG/1
40 CAPSULE, DELAYED RELEASE ORAL DAILY PRN
Qty: 30 CAPSULE | Refills: 1 | Status: SHIPPED | OUTPATIENT
Start: 2023-03-30 | End: 2023-07-24

## 2023-03-30 NOTE — TELEPHONE ENCOUNTER
" omeprazole (PRILOSEC) 40 MG DR capsule    Last Written Prescription Date:  9/16/22  Last Fill Quantity: 30,   # refills: 1  Last Office Visit : 1/17/23  Future Office visit:  6/13/23    Routing refill request to provider for review/approval because:  Drug not active on patient's medication list  1/17/23, Dr Conde note\"Recently had US abdomen without gallstones. Lipase, CMP, CBC, and CRP were normal.   - Continue on Omeprazole 40mg daily for 2 weeks, then as needed.   - Dicyclomine as needed for abdominal pains. \"       "

## 2023-05-03 ENCOUNTER — ANCILLARY PROCEDURE (OUTPATIENT)
Dept: CT IMAGING | Facility: CLINIC | Age: 73
End: 2023-05-03
Attending: HOSPITALIST
Payer: MEDICARE

## 2023-05-03 ENCOUNTER — MYC MEDICAL ADVICE (OUTPATIENT)
Dept: INTERNAL MEDICINE | Facility: CLINIC | Age: 73
End: 2023-05-03

## 2023-05-03 DIAGNOSIS — Z13.6 CARDIOVASCULAR SCREENING; LDL GOAL LESS THAN 130: ICD-10-CM

## 2023-05-03 DIAGNOSIS — R91.8 PULMONARY NODULES: ICD-10-CM

## 2023-05-03 DIAGNOSIS — E03.9 HYPOTHYROIDISM, UNSPECIFIED TYPE: Primary | ICD-10-CM

## 2023-05-03 PROCEDURE — G1010 CDSM STANSON: HCPCS | Performed by: RADIOLOGY

## 2023-05-03 PROCEDURE — 71250 CT THORAX DX C-: CPT | Mod: MF | Performed by: RADIOLOGY

## 2023-05-15 ENCOUNTER — TELEPHONE (OUTPATIENT)
Dept: OPHTHALMOLOGY | Facility: CLINIC | Age: 73
End: 2023-05-15
Payer: MEDICARE

## 2023-05-15 DIAGNOSIS — H53.15 METAMORPHOPSIA: Primary | ICD-10-CM

## 2023-05-15 PROBLEM — N95.1 MENOPAUSAL SYNDROME: Status: ACTIVE | Noted: 2023-05-15

## 2023-05-15 PROBLEM — Z87.442 HISTORY OF RENAL CALCULI: Status: ACTIVE | Noted: 2023-05-15

## 2023-05-15 PROBLEM — H26.9 CATARACTS, BOTH EYES: Status: ACTIVE | Noted: 2023-05-15

## 2023-05-15 PROBLEM — C43.59 MALIGNANT MELANOMA OF SKIN OF CHEST (H): Status: ACTIVE | Noted: 2022-11-03

## 2023-05-15 PROBLEM — R30.0 DYSURIA: Status: ACTIVE | Noted: 2023-05-15

## 2023-05-15 PROBLEM — D25.9 UTERINE LEIOMYOMA: Status: ACTIVE | Noted: 2023-05-15

## 2023-05-15 PROBLEM — B15.9 VIRAL HEPATITIS A: Status: ACTIVE | Noted: 2023-05-15

## 2023-05-15 PROBLEM — Z87.42 HISTORY OF ABNORMAL CERVICAL PAPANICOLAOU SMEAR: Status: ACTIVE | Noted: 2023-05-15

## 2023-05-15 PROBLEM — E04.2 NON-TOXIC MULTINODULAR GOITER: Status: ACTIVE | Noted: 2021-10-25

## 2023-05-15 PROBLEM — R07.9 CHEST PAIN: Status: ACTIVE | Noted: 2021-02-16

## 2023-05-15 PROBLEM — N89.8 LEUKORRHEA: Status: ACTIVE | Noted: 2023-05-15

## 2023-05-15 PROBLEM — H90.3 SENSORINEURAL HEARING LOSS (SNHL) OF BOTH EARS: Status: ACTIVE | Noted: 2021-10-25

## 2023-05-15 PROBLEM — M85.80 SENILE OSTEOPENIA: Status: ACTIVE | Noted: 2023-05-15

## 2023-05-15 PROBLEM — M81.0 OSTEOPOROSIS: Status: ACTIVE | Noted: 2017-12-31

## 2023-05-15 PROBLEM — N39.0 URINARY TRACT INFECTIOUS DISEASE: Status: ACTIVE | Noted: 2023-05-15

## 2023-05-15 NOTE — TELEPHONE ENCOUNTER
[facility] Call Center    Phone Message    May a detailed message be left on voicemail: yes     Reason for Call: Symptoms or Concerns     If patient has red-flag symptoms, warm transfer to triage line    Current symptom or concern: Vision disturbance. F/U [facility] ER Visit possible TIA. First available with Dr. Chávez is 7/6/23.  Please call.  Thank you    Symptoms have been present for:   day(s)    Has patient previously been seen for this? No    By :     Date:     Are there any new or worsening symptoms? No.       Action Taken: Message routed to:  [facility] ([facility] Ophthalmology    Travel Screening: Not Applicable
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
## 2023-05-16 ENCOUNTER — OFFICE VISIT (OUTPATIENT)
Dept: OPHTHALMOLOGY | Facility: CLINIC | Age: 73
End: 2023-05-16
Attending: OPTOMETRIST
Payer: MEDICARE

## 2023-05-16 DIAGNOSIS — H02.132 SENILE ECTROPION OF BOTH LOWER EYELIDS: ICD-10-CM

## 2023-05-16 DIAGNOSIS — H43.813 VITREOUS DETACHMENT OF BOTH EYES: ICD-10-CM

## 2023-05-16 DIAGNOSIS — Z98.890 S/P LASIK (LASER ASSISTED IN SITU KERATOMILEUSIS): ICD-10-CM

## 2023-05-16 DIAGNOSIS — H40.003 GLAUCOMA SUSPECT OF BOTH EYES: ICD-10-CM

## 2023-05-16 DIAGNOSIS — H35.3131 EARLY DRY STAGE NONEXUDATIVE AGE-RELATED MACULAR DEGENERATION OF BOTH EYES: ICD-10-CM

## 2023-05-16 DIAGNOSIS — H35.372 EPIRETINAL MEMBRANE (ERM) OF LEFT EYE: ICD-10-CM

## 2023-05-16 DIAGNOSIS — H04.123 DRY EYE SYNDROME OF BOTH EYES: ICD-10-CM

## 2023-05-16 DIAGNOSIS — Z86.73 HISTORY OF TIA (TRANSIENT ISCHEMIC ATTACK): Primary | ICD-10-CM

## 2023-05-16 DIAGNOSIS — H02.135 SENILE ECTROPION OF BOTH LOWER EYELIDS: ICD-10-CM

## 2023-05-16 PROCEDURE — 92134 CPTRZ OPH DX IMG PST SGM RTA: CPT | Performed by: OPTOMETRIST

## 2023-05-16 PROCEDURE — G0463 HOSPITAL OUTPT CLINIC VISIT: HCPCS | Performed by: OPTOMETRIST

## 2023-05-16 PROCEDURE — 92133 CPTRZD OPH DX IMG PST SGM ON: CPT | Performed by: OPTOMETRIST

## 2023-05-16 PROCEDURE — 92004 COMPRE OPH EXAM NEW PT 1/>: CPT | Performed by: OPTOMETRIST

## 2023-05-16 PROCEDURE — 99207 OCT OPTIC NERVE RNFL SPECTRALIS OU (BOTH EYES): CPT | Mod: 26 | Performed by: OPTOMETRIST

## 2023-05-16 ASSESSMENT — REFRACTION_MANIFEST
OD_AXIS: 020
OS_CYLINDER: SPHERE
OD_ADD: +2.50
OD_SPHERE: -1.25
OS_ADD: +2.50
OD_CYLINDER: +1.75
OS_SPHERE: -1.25

## 2023-05-16 ASSESSMENT — REFRACTION_WEARINGRX
OD_AXIS: 178
OD_SPHERE: -1.25
OD_ADD: +2.50
OD_CYLINDER: +0.25
OS_CYLINDER: SPHERE
OS_SPHERE: -2.00
OS_ADD: +2.50

## 2023-05-16 ASSESSMENT — VISUAL ACUITY
OD_CC+: -2
METHOD: SNELLEN - LINEAR
OD_CC: 20/20
OS_CC+: -2
OS_CC: 20/30
CORRECTION_TYPE: GLASSES

## 2023-05-16 ASSESSMENT — TONOMETRY
OD_IOP_MMHG: 12
IOP_METHOD: TONOPEN
OS_IOP_MMHG: 12

## 2023-05-16 ASSESSMENT — CONF VISUAL FIELD
OS_NORMAL: 1
OD_SUPERIOR_NASAL_RESTRICTION: 0
OD_SUPERIOR_TEMPORAL_RESTRICTION: 0
OS_SUPERIOR_NASAL_RESTRICTION: 0
OD_NORMAL: 1
OS_INFERIOR_TEMPORAL_RESTRICTION: 0
OS_INFERIOR_NASAL_RESTRICTION: 0
OD_INFERIOR_NASAL_RESTRICTION: 0
OD_INFERIOR_TEMPORAL_RESTRICTION: 0
OS_SUPERIOR_TEMPORAL_RESTRICTION: 0
METHOD: COUNTING FINGERS

## 2023-05-16 ASSESSMENT — EXTERNAL EXAM - RIGHT EYE: OD_EXAM: NORMAL

## 2023-05-16 ASSESSMENT — EXTERNAL EXAM - LEFT EYE: OS_EXAM: NORMAL

## 2023-05-16 ASSESSMENT — CUP TO DISC RATIO
OS_RATIO: 0.5
OD_RATIO: 0.4

## 2023-05-16 NOTE — NURSING NOTE
Chief Complaints and History of Present Illnesses   Patient presents with     Transient Vision Loss Evaluation     Chief Complaint(s) and History of Present Illness(es)     Transient Vision Loss Evaluation            Laterality: both eyes    Associated symptoms: floaters.  Negative for eye pain, headache and flashes    Treatments tried: artificial tears          Comments    Here for transient vision loss evaluation. On Saturday, while watching TV, letters started disappearing on left side of visual field - she is not sure if both eyes or just one eye. No other incidences since onset. No HA. Seen in ED and had vascular workup - results WNL. Hx of floaters without flashes - stable. No eye pain. Uses AT as needed.    Kyrie RUIZ 11:07 AM May 16, 2023

## 2023-05-30 ENCOUNTER — ANCILLARY PROCEDURE (OUTPATIENT)
Dept: GENERAL RADIOLOGY | Facility: CLINIC | Age: 73
End: 2023-05-30
Attending: HOSPITALIST
Payer: MEDICARE

## 2023-05-30 ENCOUNTER — OFFICE VISIT (OUTPATIENT)
Dept: INTERNAL MEDICINE | Facility: CLINIC | Age: 73
End: 2023-05-30
Payer: MEDICARE

## 2023-05-30 ENCOUNTER — LAB (OUTPATIENT)
Dept: LAB | Facility: CLINIC | Age: 73
End: 2023-05-30
Payer: MEDICARE

## 2023-05-30 VITALS
OXYGEN SATURATION: 99 % | DIASTOLIC BLOOD PRESSURE: 89 MMHG | HEART RATE: 71 BPM | SYSTOLIC BLOOD PRESSURE: 144 MMHG | WEIGHT: 125.9 LBS | HEIGHT: 61 IN | BODY MASS INDEX: 23.77 KG/M2

## 2023-05-30 DIAGNOSIS — R03.0 ELEVATED BLOOD PRESSURE READING WITHOUT DIAGNOSIS OF HYPERTENSION: Primary | ICD-10-CM

## 2023-05-30 DIAGNOSIS — M54.41 CHRONIC MIDLINE LOW BACK PAIN WITH RIGHT-SIDED SCIATICA: ICD-10-CM

## 2023-05-30 DIAGNOSIS — H61.23 BILATERAL IMPACTED CERUMEN: ICD-10-CM

## 2023-05-30 DIAGNOSIS — G89.29 CHRONIC MIDLINE LOW BACK PAIN WITH RIGHT-SIDED SCIATICA: ICD-10-CM

## 2023-05-30 DIAGNOSIS — M54.2 NECK PAIN: ICD-10-CM

## 2023-05-30 DIAGNOSIS — Z13.6 CARDIOVASCULAR SCREENING; LDL GOAL LESS THAN 130: ICD-10-CM

## 2023-05-30 DIAGNOSIS — E78.5 HYPERLIPIDEMIA, UNSPECIFIED HYPERLIPIDEMIA TYPE: ICD-10-CM

## 2023-05-30 DIAGNOSIS — G45.9 TIA (TRANSIENT ISCHEMIC ATTACK): ICD-10-CM

## 2023-05-30 DIAGNOSIS — R76.8 ANA POSITIVE: ICD-10-CM

## 2023-05-30 DIAGNOSIS — E03.9 HYPOTHYROIDISM, UNSPECIFIED TYPE: ICD-10-CM

## 2023-05-30 LAB
ALBUMIN SERPL BCG-MCNC: 4.8 G/DL (ref 3.5–5.2)
ALP SERPL-CCNC: 46 U/L (ref 35–104)
ALT SERPL W P-5'-P-CCNC: 18 U/L (ref 10–35)
ANION GAP SERPL CALCULATED.3IONS-SCNC: 9 MMOL/L (ref 7–15)
AST SERPL W P-5'-P-CCNC: 28 U/L (ref 10–35)
BILIRUB SERPL-MCNC: 0.8 MG/DL
BUN SERPL-MCNC: 13.2 MG/DL (ref 8–23)
CALCIUM SERPL-MCNC: 9.8 MG/DL (ref 8.8–10.2)
CHLORIDE SERPL-SCNC: 102 MMOL/L (ref 98–107)
CHOLEST SERPL-MCNC: 242 MG/DL
CREAT SERPL-MCNC: 0.81 MG/DL (ref 0.51–0.95)
DEPRECATED HCO3 PLAS-SCNC: 27 MMOL/L (ref 22–29)
GFR SERPL CREATININE-BSD FRML MDRD: 77 ML/MIN/1.73M2
GLUCOSE SERPL-MCNC: 92 MG/DL (ref 70–99)
HDLC SERPL-MCNC: 86 MG/DL
LDLC SERPL CALC-MCNC: 139 MG/DL
NONHDLC SERPL-MCNC: 156 MG/DL
POTASSIUM SERPL-SCNC: 4 MMOL/L (ref 3.4–5.3)
PROT SERPL-MCNC: 7.4 G/DL (ref 6.4–8.3)
SODIUM SERPL-SCNC: 138 MMOL/L (ref 136–145)
TRIGL SERPL-MCNC: 86 MG/DL
TSH SERPL DL<=0.005 MIU/L-ACNC: 1.73 UIU/ML (ref 0.3–4.2)

## 2023-05-30 PROCEDURE — 86160 COMPLEMENT ANTIGEN: CPT | Performed by: HOSPITALIST

## 2023-05-30 PROCEDURE — 80053 COMPREHEN METABOLIC PANEL: CPT | Performed by: PATHOLOGY

## 2023-05-30 PROCEDURE — 85730 THROMBOPLASTIN TIME PARTIAL: CPT | Performed by: HOSPITALIST

## 2023-05-30 PROCEDURE — 72110 X-RAY EXAM L-2 SPINE 4/>VWS: CPT | Performed by: STUDENT IN AN ORGANIZED HEALTH CARE EDUCATION/TRAINING PROGRAM

## 2023-05-30 PROCEDURE — 85390 FIBRINOLYSINS SCREEN I&R: CPT | Mod: 26 | Performed by: PATHOLOGY

## 2023-05-30 PROCEDURE — 84443 ASSAY THYROID STIM HORMONE: CPT | Performed by: PATHOLOGY

## 2023-05-30 PROCEDURE — 86147 CARDIOLIPIN ANTIBODY EA IG: CPT | Performed by: HOSPITALIST

## 2023-05-30 PROCEDURE — 80061 LIPID PANEL: CPT | Performed by: PATHOLOGY

## 2023-05-30 PROCEDURE — 99214 OFFICE O/P EST MOD 30 MIN: CPT | Performed by: HOSPITALIST

## 2023-05-30 PROCEDURE — 86146 BETA-2 GLYCOPROTEIN ANTIBODY: CPT | Performed by: HOSPITALIST

## 2023-05-30 PROCEDURE — 36415 COLL VENOUS BLD VENIPUNCTURE: CPT | Performed by: PATHOLOGY

## 2023-05-30 PROCEDURE — 72040 X-RAY EXAM NECK SPINE 2-3 VW: CPT | Mod: GC | Performed by: RADIOLOGY

## 2023-05-30 PROCEDURE — 86038 ANTINUCLEAR ANTIBODIES: CPT | Performed by: HOSPITALIST

## 2023-05-30 PROCEDURE — 86225 DNA ANTIBODY NATIVE: CPT | Performed by: HOSPITALIST

## 2023-05-30 RX ORDER — ATORVASTATIN CALCIUM 20 MG/1
20 TABLET, FILM COATED ORAL DAILY
Qty: 90 TABLET | Refills: 1 | Status: SHIPPED | OUTPATIENT
Start: 2023-05-30 | End: 2023-08-17 | Stop reason: SINTOL

## 2023-05-30 RX ORDER — ASPIRIN 81 MG/1
81 TABLET ORAL DAILY
COMMUNITY
Start: 2023-05-30

## 2023-05-30 ASSESSMENT — ENCOUNTER SYMPTOMS
NECK PAIN: 1
CONSTIPATION: 0
WEAKNESS: 1
SHORTNESS OF BREATH: 0
ABDOMINAL PAIN: 0
DIARRHEA: 0
BACK PAIN: 1
PALPITATIONS: 0
FREQUENCY: 0
FEVER: 0
DYSURIA: 0
HEADACHES: 1
CHILLS: 0

## 2023-05-30 NOTE — ASSESSMENT & PLAN NOTE
Patient recently was seen at [facility] for possible TIA on 5/13 with visual changes briefly and posterior headache at the time. She does not have a hx of diabetes. LDL was 122 on 5/13. MRI/MRA of brain and neck show no acute changes, normal neck. ABCD2 score of 2 for age and elevated BP.  - Patient may reduce Aspirin to 81mg daily for now.  - Will start on Atorvastatin 20mg daily.    - Patient has no murmurs, signs of heart failure, no palpitations to obtain an echocardiogram.   - Check 24 hours blood pressure monitor.   - Check Lupus anticoagulate due to hx of AUDREY positive in the past.

## 2023-05-30 NOTE — ASSESSMENT & PLAN NOTE
Patient had lipids checked at [facility] on 5/13 with Total cholesterol elevated to 232, LDL at 122.   - In setting on likely recent TIA, will start on Atorvastatin 20mg daily.

## 2023-05-30 NOTE — NURSING NOTE
Marisa Vigil is a 73 year old female patient that presents today in clinic for the following:    Chief Complaint   Patient presents with     Hospital F/U     Concerns w/ TIA/Migraine, weakness with walking     The patient's allergies and medications were reviewed as noted. A set of vitals were recorded as noted without incident. The patient does not have any other questions for the provider.    Harinder Cancino, EMT at 1:02 PM on 5/30/2023

## 2023-05-30 NOTE — PROGRESS NOTES
Assessment/Plan  Problem List Items Addressed This Visit        Nervous and Auditory    Neck pain     - Xray of cervical.  - Patient to complete Physical therapy for neck, had 3 sessions so far. If pains continue, may need to get MRI of spine (will need to obtain MRI thoracic due to suspected hemangioma at T7 on MRI in January previously).           Relevant Orders    XR Cervical Spine 2/3 Views    Chronic midline low back pain with right-sided sciatica    Relevant Medications    aspirin 81 MG EC tablet    Other Relevant Orders    XR Lumbar Spine G/E 4 Views (Completed)    Bilateral impacted cerumen     - Irrigate both ears.             Endocrine    Hyperlipidemia, unspecified hyperlipidemia type     Patient had lipids checked at [facility] on 5/13 with Total cholesterol elevated to 232, LDL at 122.   - In setting on likely recent TIA, will start on Atorvastatin 20mg daily.          Relevant Medications    atorvastatin (LIPITOR) 20 MG tablet       Circulatory    TIA (transient ischemic attack)     Patient recently was seen at [facility] for possible TIA on 5/13 with visual changes briefly and posterior headache at the time. She does not have a hx of diabetes. LDL was 122 on 5/13. MRI/MRA of brain and neck show no acute changes, normal neck. ABCD2 score of 2 for age and elevated BP.  - Patient may reduce Aspirin to 81mg daily for now.  - Will start on Atorvastatin 20mg daily.    - Patient has no murmurs, signs of heart failure, no palpitations to obtain an echocardiogram.   - Check 24 hours blood pressure monitor.   - Check Lupus anticoagulate due to hx of AUDREY positive in the past.         Relevant Medications    aspirin 81 MG EC tablet    atorvastatin (LIPITOR) 20 MG tablet    Other Relevant Orders    Anti Nuclear Ester IgG by IFA with Reflex    Complement C3    Complement C4    DNA double stranded antibodies    Lupus Anticoagulant Panel    Cardiolipin Ester IgG and IgM    Beta 2 Glycoprotein Antibodies IGG  IGM       Other    Elevated blood pressure reading without diagnosis of hypertension - Primary     Patient notes lower BP at home. Has increased blood pressure with clinic visit.   - Will obtain twenty-four hour blood pressure monitoring.         Relevant Orders    24 Hour Blood Pressure Monitor - Adult    AUDREY positive     - Labs for AUDREY recheck, C3, C4, dsDNA, Lupus anticoagulation.          Relevant Orders    Anti Nuclear Ester IgG by IFA with Reflex    Complement C3    Complement C4    DNA double stranded antibodies    Lupus Anticoagulant Panel    Cardiolipin Ester IgG and IgM    Beta 2 Glycoprotein Antibodies IGG IGM       No results found for any visits on 05/30/23.    Health Maintenance Due   Topic Date Due     DEXA  Never done     ADVANCE CARE PLANNING  Never done     HEPATITIS C SCREENING  Never done     DTAP/TDAP/TD IMMUNIZATION (1 - Tdap) Never done     MEDICARE ANNUAL WELLNESS VISIT  09/12/2020     COVID-19 Vaccine (4 - Pfizer series) 11/21/2021           Subjective   Mentions woke up with back of the neck and back of head pains. Has not had a diagnosis of migraine. Mentions had been seen at C1, C2 changes on Xray in the past by a jaw clinic in [place]. Does have periodic pinching along her neck. Mentions she did have 4 days of prednisone which helped but came back in the past. Physical therapy on her neck now for 3 sessions at [facility].     Was seen in the ER, had an episode of vision changes. She did take tumeric that morning. Was not able to see words for about. Called brother and vision was better by time she was at the ER. No nausea, no light sensitivity.    Mentions continue low back with some right leg weakness. Has been going to PT and trying to exercising. Has had back issues with right leg weakness since June 2021. Has a hx of off and on weakness.       Review of Systems   Constitutional: Negative for chills and fever.   HENT: Negative for congestion.    Respiratory: Negative for shortness of breath.     Cardiovascular: Negative for chest pain, palpitations and peripheral edema.   Gastrointestinal: Negative for abdominal pain, constipation and diarrhea.   Genitourinary: Negative for dysuria and frequency.   Musculoskeletal: Positive for back pain and neck pain.   Neurological: Positive for weakness and headaches.   Psychiatric/Behavioral: Negative for mood changes.       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     rigth left arytenoid cartilage, paramedian displacement of left true vocal cord     Cataract      Gastroesophageal reflux disease without esophagitis      H/O degenerative disc disease      Hiatal hernia      Hyperlipidemia LDL goal <130      Hypertension     Situational     Melanoma of skin (H)     s/p resection     Non-toxic multinodular goiter      Osteoarthritis      Pulmonary nodules      TMJ (temporomandibular joint syndrome) 01/11/2022    right jaw       Past Surgical History:   Procedure Laterality Date     COLONOSCOPY      2005, 2014     deviated sep       EXCISION / BIOPSY BREAST / NIPPLE / DUCT  2013    right breast duct glands removed     EYE SURGERY  2020    left eyelid     HEART CATH LEFT HEART CATH  01/27/2021    Normal     LASIK  1998     LITHOTRIPSY  2007     NH BREAST AUGMENTATION  2014     NH FACE LIFT      partial     REPAIR PTOSIS  2009    Partial face lift     SURGICAL PATHOLOGY EXAM      left hip lipoma excision     WISDOM TOOTH EXTRACTION  1969       Family History   Problem Relation Age of Onset     Breast Cancer Mother      Osteoporosis Mother      Cancer Mother         Breast cancer  early 50 s     Macular Degeneration Father      Glaucoma Father      Heart Disease Father      Atrial fibrillation Father      Hypertension Father      Osteoporosis Paternal Grandmother      Diabetes Brother         Diabetes       Social History  "    Tobacco Use     Smoking status: Never     Smokeless tobacco: Never     Tobacco comments:     Never used tobacco products   Vaping Use     Vaping status: Never Used   Substance Use Topics     Alcohol use: Yes     Alcohol/week: 1.0 standard drink of alcohol     Types: 1 Standard drinks or equivalent per week     Comment: Not often        Objective  BP (!) 144/89 (BP Location: Right arm, Patient Position: Sitting, Cuff Size: Adult Regular)   Pulse 71   Ht 1.54 m (5' 0.63\")   Wt 57.1 kg (125 lb 14.4 oz)   SpO2 99%   BMI 24.08 kg/m    Vitals taken by Michael Conde MD    Physical Exam  Constitutional:       General: She is not in acute distress.     Appearance: She is not ill-appearing or toxic-appearing.   HENT:      Head: Normocephalic.   Eyes:      Conjunctiva/sclera: Conjunctivae normal.   Neck:      Comments: Mild tenderness along upper pericervical spine.  Cardiovascular:      Rate and Rhythm: Normal rate and regular rhythm.      Heart sounds: Normal heart sounds. No murmur heard.     No friction rub. No gallop.   Pulmonary:      Effort: Pulmonary effort is normal. No respiratory distress.      Breath sounds: Normal breath sounds. No wheezing, rhonchi or rales.   Abdominal:      General: Bowel sounds are normal. There is no distension.      Palpations: Abdomen is soft.      Tenderness: There is no abdominal tenderness.   Musculoskeletal:      Cervical back: Neck supple.      Right lower leg: No edema.      Left lower leg: No edema.      Comments: Mild tenderness along mid thoracic spinous process. No pains along lumbar spinous processes. No perispinal tenderness along thoracic and lumbar spine.      Skin:     General: Skin is warm and dry.   Neurological:      Mental Status: She is alert.      Comments: 4/5 right hip flexion, 4/5 right knee extension/flexion.  5/5 left hip flexion, 5/5 left knee extension/flexion.   Decreased sensation along right right.    Psychiatric:         Mood and Affect: Mood " normal.         Thought Content: Thought content normal.         30 minutes spent on the date of the encounter doing chart review, history and exam, documentation and further activities per the note.      Return in about 2 months (around 7/30/2023).      Michael Conde MD  [facility] INTER[facility]IC[facility] [place]

## 2023-05-30 NOTE — PATIENT INSTRUCTIONS
- Xray of cervical and lumbar spine.   - Complete Physical therapy for neck. If pains continue, may need to get MRI of spine (will need to obtain MRI thoracic at least).    - Reduce to Aspirin 81mg daily.   - Start on Atorvastatin 20mg daily. Monitor for increased muscle aches.     - Labs for AUDREY, C3, C4, dsDNA, Lupus anticoagulation.   - Obtain 24 hours Blood pressure.     - Irrigate ears today.     To schedule an appointment for your twenty-four hour blood pressure monitoring, please call [phone].    Follow up again in 2 months.

## 2023-05-30 NOTE — ASSESSMENT & PLAN NOTE
Patient notes lower BP at home. Has increased blood pressure with clinic visit.   - Will obtain twenty-four hour blood pressure monitoring.

## 2023-05-30 NOTE — ASSESSMENT & PLAN NOTE
- Xray of cervical.  - Patient to complete Physical therapy for neck, had 3 sessions so far. If pains continue, may need to get MRI of spine (will need to obtain MRI thoracic due to suspected hemangioma at T7 on MRI in January previously).

## 2023-05-31 LAB
ANA SER QL IF: NEGATIVE
B2 GLYCOPROT1 IGG SERPL IA-ACNC: 1.9 U/ML
B2 GLYCOPROT1 IGM SERPL IA-ACNC: <2.4 U/ML
C3 SERPL-MCNC: 109 MG/DL (ref 81–157)
C4 SERPL-MCNC: 10 MG/DL (ref 13–39)
CARDIOLIPIN IGG SER IA-ACNC: 6.4 GPL-U/ML
CARDIOLIPIN IGG SER IA-ACNC: NEGATIVE
CARDIOLIPIN IGM SER IA-ACNC: <2 MPL-U/ML
CARDIOLIPIN IGM SER IA-ACNC: NEGATIVE
DRVVT SCREEN RATIO: 0.75
DSDNA AB SER-ACNC: <0.6 IU/ML
INR PPP: 0.95 (ref 0.85–1.15)
LA PPP-IMP: NEGATIVE
LUPUS INTERPRETATION: NORMAL
PTT RATIO: 0.87
THROMBIN TIME: 16.1 SECONDS (ref 13–19)

## 2023-06-01 ENCOUNTER — MYC MEDICAL ADVICE (OUTPATIENT)
Dept: INTERNAL MEDICINE | Facility: CLINIC | Age: 73
End: 2023-06-01
Payer: MEDICARE

## 2023-06-01 DIAGNOSIS — R29.898 WEAKNESS OF RIGHT HIP: ICD-10-CM

## 2023-06-01 DIAGNOSIS — M47.16 OSTEOARTHRITIS OF LUMBAR SPINE WITH MYELOPATHY: ICD-10-CM

## 2023-06-01 DIAGNOSIS — M89.9 LESION OF THORACIC VERTEBRA: Primary | ICD-10-CM

## 2023-06-02 ENCOUNTER — ALLIED HEALTH/NURSE VISIT (OUTPATIENT)
Dept: INTERNAL MEDICINE | Facility: CLINIC | Age: 73
End: 2023-06-02
Payer: MEDICARE

## 2023-06-02 ENCOUNTER — HOSPITAL ENCOUNTER (OUTPATIENT)
Dept: CARDIOLOGY | Facility: CLINIC | Age: 73
Discharge: HOME | End: 2023-06-02
Attending: HOSPITALIST | Admitting: HOSPITALIST
Payer: MEDICARE

## 2023-06-02 DIAGNOSIS — H61.23 IMPACTED CERUMEN OF BOTH EARS: Primary | ICD-10-CM

## 2023-06-02 DIAGNOSIS — R03.0 ELEVATED BLOOD PRESSURE READING WITHOUT DIAGNOSIS OF HYPERTENSION: ICD-10-CM

## 2023-06-02 PROCEDURE — 69209 REMOVE IMPACTED EAR WAX UNI: CPT | Mod: 50

## 2023-06-02 PROCEDURE — 93790 AMBL BP MNTR W/SW I&R: CPT | Performed by: INTERNAL MEDICINE

## 2023-06-02 PROCEDURE — 93786 AMBL BP MNTR W/SW REC ONLY: CPT

## 2023-06-02 NOTE — PROGRESS NOTES
Marisa Vigil presents in the primary clinic today at the request of Dr Conde for an ear wash. The patient's ears were assessed for cerumen. After this, an ear wash was performed in which a small amount of impacted cerumen was removed from both ears. After the ear wash, the tympanic membrane was visible. The ear wash was provided today under the supervision of Dr Bertrand who was present if help was needed.    Steven Hdz, EMT at 10:07 AM on 6/2/2023.  Primary care clinic: [phone]

## 2023-06-04 ENCOUNTER — HEALTH MAINTENANCE LETTER (OUTPATIENT)
Age: 73
End: 2023-06-04

## 2023-06-07 ENCOUNTER — MYC MEDICAL ADVICE (OUTPATIENT)
Dept: INTERNAL MEDICINE | Facility: CLINIC | Age: 73
End: 2023-06-07
Payer: MEDICARE

## 2023-06-07 DIAGNOSIS — H35.372 EPIRETINAL MEMBRANE (ERM) OF LEFT EYE: Primary | ICD-10-CM

## 2023-06-07 DIAGNOSIS — M54.2 NECK PAIN: Primary | ICD-10-CM

## 2023-06-10 ENCOUNTER — HOSPITAL ENCOUNTER (OUTPATIENT)
Dept: MRI IMAGING | Facility: CLINIC | Age: 73
Discharge: HOME | End: 2023-06-10
Attending: HOSPITALIST
Payer: MEDICARE

## 2023-06-10 DIAGNOSIS — M47.16 OSTEOARTHRITIS OF LUMBAR SPINE WITH MYELOPATHY: ICD-10-CM

## 2023-06-10 DIAGNOSIS — M89.9 LESION OF THORACIC VERTEBRA: ICD-10-CM

## 2023-06-10 DIAGNOSIS — R29.898 WEAKNESS OF RIGHT HIP: ICD-10-CM

## 2023-06-10 PROCEDURE — A9585 GADOBUTROL INJECTION: HCPCS | Performed by: HOSPITALIST

## 2023-06-10 PROCEDURE — G1010 CDSM STANSON: HCPCS

## 2023-06-10 PROCEDURE — 73721 MRI JNT OF LWR EXTRE W/O DYE: CPT | Mod: RT,MF

## 2023-06-10 PROCEDURE — 255N000002 HC RX 255 OP 636: Performed by: HOSPITALIST

## 2023-06-10 RX ORDER — GADOBUTROL 604.72 MG/ML
6 INJECTION INTRAVENOUS ONCE
Status: COMPLETED | OUTPATIENT
Start: 2023-06-10 | End: 2023-06-10

## 2023-06-10 RX ADMIN — GADOBUTROL 6 ML: 604.72 INJECTION INTRAVENOUS at 16:42

## 2023-06-11 ENCOUNTER — HOSPITAL ENCOUNTER (OUTPATIENT)
Dept: MRI IMAGING | Facility: CLINIC | Age: 73
Discharge: HOME | End: 2023-06-11
Attending: HOSPITALIST | Admitting: HOSPITALIST
Payer: MEDICARE

## 2023-06-11 DIAGNOSIS — R29.898 WEAKNESS OF RIGHT HIP: Primary | ICD-10-CM

## 2023-06-11 DIAGNOSIS — M54.2 NECK PAIN: ICD-10-CM

## 2023-06-11 PROCEDURE — 72141 MRI NECK SPINE W/O DYE: CPT | Mod: ME

## 2023-06-12 ENCOUNTER — MYC MEDICAL ADVICE (OUTPATIENT)
Dept: INTERNAL MEDICINE | Facility: CLINIC | Age: 73
End: 2023-06-12
Payer: MEDICARE

## 2023-06-12 DIAGNOSIS — M89.9 LESION OF BONE OF THORACIC SPINE: Primary | ICD-10-CM

## 2023-06-12 NOTE — TELEPHONE ENCOUNTER
Imaging Requested     June 12, 2023 11:58 AM  [id number]   Facility  [facility]   Phone: [phone] / Fax: [phone]  Patient ID: [id number]   Outcome DEXA report in care everywhere, called for images to be pushed - received images in PACS - Amay        DIAGNOSIS: Weakness of right hip  Michael Conde MD in [facility]  MRI  medicare   APPOINTMENT DATE: 6/13/23   NOTES STATUS DETAILS   OFFICE NOTE from referring provider Internal 6/11/23 referral   5/30/23 OV Michael Conde MD   EMG report Received 12/30/21 ([place], [place])    MEDICATION LIST Internal    DEXA (osteoporosis/bone health) Care Everywhere [facility]: 7/15/22   [facility] imaging  Report: Epic  Images: PACS  MR right hip: 6/10/23  MR Lumbar: 6/10/23  XR Lumbar Spine: 5/30/23

## 2023-06-13 ENCOUNTER — PRE VISIT (OUTPATIENT)
Dept: ORTHOPEDICS | Facility: CLINIC | Age: 73
End: 2023-06-13

## 2023-06-13 ENCOUNTER — OFFICE VISIT (OUTPATIENT)
Dept: ORTHOPEDICS | Facility: CLINIC | Age: 73
End: 2023-06-13
Attending: HOSPITALIST
Payer: MEDICARE

## 2023-06-13 ENCOUNTER — MYC REFILL (OUTPATIENT)
Dept: INTERNAL MEDICINE | Facility: CLINIC | Age: 73
End: 2023-06-13

## 2023-06-13 DIAGNOSIS — M25.551 GREATER TROCHANTERIC PAIN SYNDROME OF RIGHT LOWER EXTREMITY: Primary | ICD-10-CM

## 2023-06-13 DIAGNOSIS — R29.898 WEAKNESS OF RIGHT HIP: ICD-10-CM

## 2023-06-13 DIAGNOSIS — E03.9 HYPOTHYROIDISM, UNSPECIFIED TYPE: Primary | ICD-10-CM

## 2023-06-13 DIAGNOSIS — S76.011A TEAR OF GLUTEUS MINIMUS TENDON, RIGHT, INITIAL ENCOUNTER: ICD-10-CM

## 2023-06-13 DIAGNOSIS — M70.71 ISCHIAL BURSITIS OF RIGHT SIDE: ICD-10-CM

## 2023-06-13 PROCEDURE — 99214 OFFICE O/P EST MOD 30 MIN: CPT | Performed by: FAMILY MEDICINE

## 2023-06-13 RX ORDER — LEVOTHYROXINE SODIUM 88 UG/1
88 TABLET ORAL DAILY
Qty: 90 TABLET | Refills: 3 | Status: SHIPPED | OUTPATIENT
Start: 2023-06-13

## 2023-06-13 NOTE — LETTER
6/13/2023      RE: Marisa Vigil  [address]  [place] [facility] [place] [address]     Dear Colleague,    Thank you for referring your patient, Marisa Vigil, to the [facility] [place]. Please see a copy of my visit note below.    ASSESSMENT/PLAN:    (M24.642) Greater trochanteric pain syndrome of right lower extremity  (primary encounter diagnosis)  Comment: reviewed exam and imaging findings at length; she has both trochanteric and ischial bursitis and I believe she would benefit from a guided injection for both; Glut minimus tear is non-surgical but may be contributing to her weakness. She has no myelopathy/ cord compression on c-spine/t-spine MRI and no significant canal stenosis on lumbar MRI. I will help set her up with one of my colleagues; I did also emphasize the importance her neurosurgery follow-up next week given concern for osseous met on t-spine MRI  Plan: Physical Therapy Referral          (R29.898) Weakness of right hip  Comment:   Plan: see above    (S76.011A) Tear of gluteus minimus tendon, right, initial encounter  Comment: see above  Plan: Physical Therapy Referral          (M70.71) Ischial bursitis of right side  Comment: see above  Plan: Physical Therapy Referral          Inder Greene MD  June 14, 2023  5:41 PM        Pt is a 73 year old female referred by Dr Conde here today for:     Right Hip pain :   Location? Right buttock   Duration? Months, worsening    Injury/ Inciting activity? Does note shoveling snow this winter and noted feeling a lot of pain w/ lifting and twisting - 4/1/23  Pop? No    Swelling/Bruising? No    Limited motion? No    Snapping/ Clicking? No    Giving way/ instability? Weakness of the right lower leg    Numbness/Tingling? Intermittent   Imaging? MR on 6/10/23  Treatment? Physical therapy; low back      MRI hip - 6/10/23    FINDINGS:  There is artifact which deteriorates image quality along the inferior aspect of the  field-of-view.     RIGHT HIP:   -Labrum: Short segment degenerative tear along the anterosuperior labrum involving the chondral labral junction. No paralabral cyst.   -Cartilage: Localized partial-thickness cartilage loss and fissuring along the anterosuperior acetabular rim near the chondral labral junction. Otherwise, there is no focal cartilage defects. No subchondral bone marrow edema.  -Joint space: Small amount of joint fluid. No discrete loose bodies.   -Joint capsule/ligaments: Intact joint capsule. Ligamentum teres is diminutive but intact.     MUSCLES AND TENDONS:   -Gluteal: There is advanced tendinopathy of the right gluteus minimus tendon with a high-grade tear at the trochanteric insertion with adjacent greater trochanteric bursitis. Advanced gluteus medius tendinopathy without a discrete tear.  -Proximal hamstring: No high-grade tear. No edema about the ischial tuberosity.  -Iliopsoas: No tendon tear or tendinopathy. No bursitis.  -Rectus femoris origin: No tear or tendinopathy.      BONES:   -There is no evidence of a fracture or bony stress reaction about the right hip.   -Visualized right SI joint is maintained.     SOFT TISSUES:   -Normal muscle bulk. No acute muscular injury.     INTRA-PELVIC CONTENTS:  -Visualized portions are normal.                                                                    IMPRESSION:  1.  Right greater trochanteric bursitis with a high-grade tear of the right gluteus minimus tendon. Advanced tendinopathy of both gluteus minimus and medius.   2.  Mild degenerative changes of the right hip with a short segment anterosuperior labral tear.      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     rigth left arytenoid cartilage, paramedian displacement of left true vocal cord     Cataract      Gastroesophageal reflux disease without esophagitis       H/O degenerative disc disease      Hiatal hernia      Hypertension     Situational     Melanoma of skin (H)     s/p resection     Non-toxic multinodular goiter      Osteoarthritis      Pulmonary nodules      TMJ (temporomandibular joint syndrome) 01/11/2022    right jaw      Past Surgical History:   Procedure Laterality Date     COLONOSCOPY      2005, 2014     deviated sep       EXCISION / BIOPSY BREAST / NIPPLE / DUCT  2013    right breast duct glands removed     EYE SURGERY  2020    left eyelid     HEART CATH LEFT HEART CATH  01/27/2021    Normal     LASIK  1998     LITHOTRIPSY  2007     AL BREAST AUGMENTATION  2014     AL FACE LIFT      partial     REPAIR PTOSIS  2009    Partial face lift     SURGICAL PATHOLOGY EXAM      left hip lipoma excision     WISDOM TOOTH EXTRACTION  1969      Current Outpatient Medications   Medication Sig Dispense Refill     aspirin 81 MG EC tablet Take 1 tablet (81 mg) by mouth daily       atorvastatin (LIPITOR) 20 MG tablet Take 1 tablet (20 mg) by mouth daily 90 tablet 1     calcium citrate and vitamin D (CITRACAL) 200-250 MG-UNIT TABS per tablet Take 1 tablet by mouth 2 times daily       levothyroxine (SYNTHROID/LEVOTHROID) 88 MCG tablet Take 1 tablet (88 mcg) by mouth daily Every day but Sunday 90 tablet 3     methocarbamol (ROBAXIN) 500 MG tablet Take 1 tablet (500 mg) by mouth nightly as needed for muscle spasms 30 tablet 1     multivitamin w/minerals (THERA-VIT-M) tablet Take 1 tablet by mouth daily       omeprazole (PRILOSEC) 40 MG DR capsule Take 1 capsule (40 mg) by mouth daily as needed 30 capsule 1     dicyclomine (BENTYL) 10 MG capsule Take 1 capsule (10 mg) by mouth 4 times daily (before meals and nightly) 30 capsule 0      Allergies   Allergen Reactions     Clindamycin Blisters     Vancomycin Rash     Versed [Midazolam] Dizziness     Pt reports memory issues     Fosamax [Alendronate]      Heart burn     Parathyroid Hormone (Recomb) Other (See Comments)     Facial  swelling on 3/2022      ROS:   Gen- no fevers/chills   Rheum - no morning stiffness   Derm - no rash/ redness   Neuro - see HPI  Remainder of ROS negative.     Exam:   There were no vitals taken for this visit.       R Hip:   Inspection: Swelling - No; Bruising - NO  ROM: Flexion -full; Extension - full; ER - full; IR -limited; Abduction -painful; Adduction full  Strength: Full in all planes; Pain with resisted hip abduction  Tenderness: Trochanter - YES ASIS - NO; Inguinal Ligament - NO; Pubic Symphysis - NO; Ischial Tuberosity- YES; Hamstring - NO; SI Joint - YES.   Maneuvers: JAMILA - POS for posterolateral pain; FADIR - POS for groin pan; Grind - Neg; T SI joint - Neg; Trendelenburg - POS; Slump/ SLR - Neg              Again, thank you for allowing me to participate in the care of your patient.      Sincerely,    Inder Greene MD

## 2023-06-13 NOTE — PROGRESS NOTES
ASSESSMENT/PLAN:    (M25.551) Greater trochanteric pain syndrome of right lower extremity  (primary encounter diagnosis)  Comment: reviewed exam and imaging findings at length; she has both trochanteric and ischial bursitis and I believe she would benefit from a guided injection for both; Glut minimus tear is non-surgical but may be contributing to her weakness. She has no myelopathy/ cord compression on c-spine/t-spine MRI and no significant canal stenosis on lumbar MRI. I will help set her up with one of my colleagues; I did also emphasize the importance her neurosurgery follow-up next week given concern for osseous met on t-spine MRI  Plan: Physical Therapy Referral          (R29.898) Weakness of right hip  Comment:   Plan: see above    (S76.011A) Tear of gluteus minimus tendon, right, initial encounter  Comment: see above  Plan: Physical Therapy Referral          (M70.71) Ischial bursitis of right side  Comment: see above  Plan: Physical Therapy Referral          Inder Greene MD  June 14, 2023  5:41 PM        Pt is a 73 year old female referred by Dr Conde here today for:     Right Hip pain :   Location? Right buttock   Duration? Months, worsening    Injury/ Inciting activity? Does note shoveling snow this winter and noted feeling a lot of pain w/ lifting and twisting - 4/1/23  Pop? No    Swelling/Bruising? No    Limited motion? No    Snapping/ Clicking? No    Giving way/ instability? Weakness of the right lower leg    Numbness/Tingling? Intermittent   Imaging? MR on 6/10/23  Treatment? Physical therapy; low back      MRI hip - 6/10/23    FINDINGS:  There is artifact which deteriorates image quality along the inferior aspect of the field-of-view.     RIGHT HIP:   -Labrum: Short segment degenerative tear along the anterosuperior labrum involving the chondral labral junction. No paralabral cyst.   -Cartilage: Localized partial-thickness cartilage loss and fissuring along the anterosuperior acetabular rim near  the chondral labral junction. Otherwise, there is no focal cartilage defects. No subchondral bone marrow edema.  -Joint space: Small amount of joint fluid. No discrete loose bodies.   -Joint capsule/ligaments: Intact joint capsule. Ligamentum teres is diminutive but intact.     MUSCLES AND TENDONS:   -Gluteal: There is advanced tendinopathy of the right gluteus minimus tendon with a high-grade tear at the trochanteric insertion with adjacent greater trochanteric bursitis. Advanced gluteus medius tendinopathy without a discrete tear.  -Proximal hamstring: No high-grade tear. No edema about the ischial tuberosity.  -Iliopsoas: No tendon tear or tendinopathy. No bursitis.  -Rectus femoris origin: No tear or tendinopathy.      BONES:   -There is no evidence of a fracture or bony stress reaction about the right hip.   -Visualized right SI joint is maintained.     SOFT TISSUES:   -Normal muscle bulk. No acute muscular injury.     INTRA-PELVIC CONTENTS:  -Visualized portions are normal.                                                                    IMPRESSION:  1.  Right greater trochanteric bursitis with a high-grade tear of the right gluteus minimus tendon. Advanced tendinopathy of both gluteus minimus and medius.   2.  Mild degenerative changes of the right hip with a short segment anterosuperior labral tear.      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     rigth left arytenoid cartilage, paramedian displacement of left true vocal cord     Cataract      Gastroesophageal reflux disease without esophagitis      H/O degenerative disc disease      Hiatal hernia      Hypertension     Situational     Melanoma of skin (H)     s/p resection     Non-toxic multinodular goiter      Osteoarthritis      Pulmonary nodules      TMJ (temporomandibular joint syndrome) 01/11/2022    right jaw       Past Surgical History:   Procedure Laterality Date     COLONOSCOPY      2005, 2014     deviated sep       EXCISION / BIOPSY BREAST / NIPPLE / DUCT  2013    right breast duct glands removed     EYE SURGERY  2020    left eyelid     HEART CATH LEFT HEART CATH  01/27/2021    Normal     LASIK  1998     LITHOTRIPSY  2007     NM BREAST AUGMENTATION  2014     NM FACE LIFT      partial     REPAIR PTOSIS  2009    Partial face lift     SURGICAL PATHOLOGY EXAM      left hip lipoma excision     WISDOM TOOTH EXTRACTION  1969      Current Outpatient Medications   Medication Sig Dispense Refill     aspirin 81 MG EC tablet Take 1 tablet (81 mg) by mouth daily       atorvastatin (LIPITOR) 20 MG tablet Take 1 tablet (20 mg) by mouth daily 90 tablet 1     calcium citrate and vitamin D (CITRACAL) 200-250 MG-UNIT TABS per tablet Take 1 tablet by mouth 2 times daily       levothyroxine (SYNTHROID/LEVOTHROID) 88 MCG tablet Take 1 tablet (88 mcg) by mouth daily Every day but Sunday 90 tablet 3     methocarbamol (ROBAXIN) 500 MG tablet Take 1 tablet (500 mg) by mouth nightly as needed for muscle spasms 30 tablet 1     multivitamin w/minerals (THERA-VIT-M) tablet Take 1 tablet by mouth daily       omeprazole (PRILOSEC) 40 MG DR capsule Take 1 capsule (40 mg) by mouth daily as needed 30 capsule 1     dicyclomine (BENTYL) 10 MG capsule Take 1 capsule (10 mg) by mouth 4 times daily (before meals and nightly) 30 capsule 0      Allergies   Allergen Reactions     Clindamycin Blisters     Vancomycin Rash     Versed [Midazolam] Dizziness     Pt reports memory issues     Fosamax [Alendronate]      Heart burn     Parathyroid Hormone (Recomb) Other (See Comments)     Facial swelling on 3/2022      ROS:   Gen- no fevers/chills   Rheum - no morning stiffness   Derm - no rash/ redness   Neuro - see HPI  Remainder of ROS negative.     Exam:   There were no vitals taken for this visit.       R Hip:   Inspection: Swelling - No; Bruising - NO  ROM: Flexion  -full; Extension - full; ER - full; IR -limited; Abduction -painful; Adduction full  Strength: Full in all planes; Pain with resisted hip abduction  Tenderness: Trochanter - YES ASIS - NO; Inguinal Ligament - NO; Pubic Symphysis - NO; Ischial Tuberosity- YES; Hamstring - NO; SI Joint - YES.   Maneuvers: JAMILA - POS for posterolateral pain; FADIR - POS for groin pan; Grind - Neg; T SI joint - Neg; Trendelenburg - POS; Slump/ SLR - Neg

## 2023-06-13 NOTE — TELEPHONE ENCOUNTER
levothyroxine (SYNTHROID/LEVOTHROID) 88 MCG tablet  Last Written Prescription Date:  Unknown  Last Fill Quantity: unknown,   # refills: unknown  Last Office Visit : 5/30/23  Future Office visit:  7/31/23      Routing refill request to provider for review/approval because:   Historical. My chart request

## 2023-06-14 ENCOUNTER — TELEPHONE (OUTPATIENT)
Dept: NEUROSURGERY | Facility: CLINIC | Age: 73
End: 2023-06-14
Payer: MEDICARE

## 2023-06-14 NOTE — TELEPHONE ENCOUNTER
Records requested from [facility] 06/14/23 @ 2:05pm.    Trigger point injection reports.

## 2023-06-15 ENCOUNTER — MYC MEDICAL ADVICE (OUTPATIENT)
Dept: INTERNAL MEDICINE | Facility: CLINIC | Age: 73
End: 2023-06-15
Payer: MEDICARE

## 2023-06-16 ENCOUNTER — MYC MEDICAL ADVICE (OUTPATIENT)
Dept: INTERNAL MEDICINE | Facility: CLINIC | Age: 73
End: 2023-06-16
Payer: MEDICARE

## 2023-06-16 ENCOUNTER — TELEPHONE (OUTPATIENT)
Dept: NEUROSURGERY | Facility: CLINIC | Age: 73
End: 2023-06-16
Payer: MEDICARE

## 2023-06-16 NOTE — TELEPHONE ENCOUNTER
Patient requests call back regarding her pre-check in questionnaire. Patient states it is incorrect and wants to double check that she is seeing the correct provider for her dx etc. Please call patient back at [phone]. Thank you~

## 2023-06-16 NOTE — TELEPHONE ENCOUNTER
Returned call to patient to discuss. Patient scheduled correctly, questions answered. Patient had no further questions at this time.

## 2023-06-19 ENCOUNTER — MYC MEDICAL ADVICE (OUTPATIENT)
Dept: INTERNAL MEDICINE | Facility: CLINIC | Age: 73
End: 2023-06-19
Payer: MEDICARE

## 2023-06-20 DIAGNOSIS — M89.9 LESION OF THORACIC VERTEBRA: Primary | ICD-10-CM

## 2023-06-20 DIAGNOSIS — M89.9 LESION OF THORACIC VERTEBRA: ICD-10-CM

## 2023-06-20 NOTE — CONSULTS
Outpatient Neuroradiology Biopsy Referral    Patient is a 72 y/o female with a PMH of chronic pain, pulmonary nodules, GERD, hypothyroid, HDL, AUDREY, melanoma and SCC skin, osteoarthritis, myelopathy. NR has been asked to biopsy T8 vertebral lesion.    MRI 6/10/23 IMPRESSION:  1.  Enhancing lesion in the vertebral body on the right with extension to the posterior elements is concerning for osseous metastatic disease.  2.  Indeterminate T1/T2 hypointense lesion with questionable faint enhancement in the T8 vertebral body. Finding is indeterminate for early metastatic disease. Recommend attention on follow-up.    Case and imaging MRI 6/10/23 was reviewed with Dr. Harris from Neuroradiology and CT guided biopsy of T8 lesion is approved.     No hold ASA     Procedure order, surgical pathology order placed.    If requesting team would like samples sent for anything else please enter them prior to scheduled procedure.    Primary team Dr. Conde Internal Medicine made aware of Neuroradiology recommendations via epic messaging    Liane BLACK  Interventional Radiology   IR on-call pager: [phone]

## 2023-06-21 ENCOUNTER — OFFICE VISIT (OUTPATIENT)
Dept: INTERNAL MEDICINE | Facility: CLINIC | Age: 73
End: 2023-06-21
Payer: MEDICARE

## 2023-06-21 VITALS
WEIGHT: 123.8 LBS | DIASTOLIC BLOOD PRESSURE: 82 MMHG | OXYGEN SATURATION: 99 % | HEART RATE: 69 BPM | BODY MASS INDEX: 23.37 KG/M2 | HEIGHT: 61 IN | SYSTOLIC BLOOD PRESSURE: 161 MMHG

## 2023-06-21 DIAGNOSIS — M89.9 BONE LESION: ICD-10-CM

## 2023-06-21 DIAGNOSIS — R03.0 ELEVATED BP WITHOUT DIAGNOSIS OF HYPERTENSION: ICD-10-CM

## 2023-06-21 DIAGNOSIS — Z01.818 PREOP GENERAL PHYSICAL EXAM: Primary | ICD-10-CM

## 2023-06-21 PROCEDURE — 99214 OFFICE O/P EST MOD 30 MIN: CPT | Performed by: INTERNAL MEDICINE

## 2023-06-21 ASSESSMENT — ENCOUNTER SYMPTOMS
MUSCLE CRAMPS: 0
MUSCLE WEAKNESS: 1
STIFFNESS: 0
NECK PAIN: 1
BACK PAIN: 1
JOINT SWELLING: 0
MYALGIAS: 1
ARTHRALGIAS: 1

## 2023-06-21 NOTE — NURSING NOTE
Marisa Vigil is a 73 year old female patient that presents today in clinic for the following:    Chief Complaint   Patient presents with     Pre-Op Exam     CT Bone Biopsy, [facility], 7/6/2023     The patient's allergies and medications were reviewed as noted. A set of vitals were recorded as noted without incident. The patient does not have any other questions for the provider.    Harinder Cancino, EMT at 11:45 AM on 6/21/2023

## 2023-06-21 NOTE — PATIENT INSTRUCTIONS
For informational purposes only. Not to replace the advice of your health care provider. Copyright   2003,  [facility]. All rights reserved. Clinically reviewed by Cele Booker MD. [facility] [id number] - REV .  Preparing for Your Surgery  Getting started  A nurse will call you to review your health history and instructions. They will give you an arrival time based on your scheduled surgery time. Please be ready to share:  Your doctor's clinic name and phone number  Your medical, surgical, and anesthesia history  A list of allergies and sensitivities  A list of medicines, including herbal treatments and over-the-counter drugs  Whether the patient has a legal guardian (ask how to send us the papers in advance)  Please tell us if you're pregnant--or if there's any chance you might be pregnant. Some surgeries may injure a fetus (unborn baby), so they require a pregnancy test. Surgeries that are safe for a fetus don't always need a test, and you can choose whether to have one.   If you have a child who's having surgery, please ask for a copy of Preparing for Your Child's Surgery.    Preparing for surgery  Within 10 to 30 days of surgery: Have a pre-op exam (sometimes called an H&P, or History and Physical). This can be done at a clinic or pre-operative center.  If you're having a , you may not need this exam. Talk to your care team.  At your pre-op exam, talk to your care team about all medicines you take. If you need to stop any medicines before surgery, ask when to start taking them again.  We do this for your safety. Many medicines can make you bleed too much during surgery. Some change how well surgery (anesthesia) drugs work.  Call your insurance company to let them know you're having surgery. (If you don't have insurance, call [phone].)  Call your clinic if there's any change in your health. This includes signs of a cold or flu (sore throat, runny nose, cough, rash, fever). It  also includes a scrape or scratch near the surgery site.  If you have questions on the day of surgery, call your hospital or surgery center.  Eating and drinking guidelines  For your safety: Unless your surgeon tells you otherwise, follow the guidelines below.  Eat and drink as usual until 8 hours before you arrive for surgery. After that, no food or milk.  Drink clear liquids until 2 hours before you arrive. These are liquids you can see through, like water, Gatorade, and Propel Water. They also include plain black coffee and tea (no cream or milk), candy, and breath mints. You can spit out gum when you arrive.  If you drink alcohol: Stop drinking it the night before surgery.  If your care team tells you to take medicine on the morning of surgery, it's okay to take it with a sip of water.  Preventing infection  Shower or bathe the night before and morning of your surgery. Follow the instructions your clinic gave you. (If no instructions, use regular soap.)  Don't shave or clip hair near your surgery site. We'll remove the hair if needed.  Don't smoke or vape the morning of surgery. You may chew nicotine gum up to 2 hours before surgery. A nicotine patch is okay.  Note: Some surgeries require you to completely quit smoking and nicotine. Check with your surgeon.  Your care team will make every effort to keep you safe from infection. We will:  Clean our hands often with soap and water (or an alcohol-based hand rub).  Clean the skin at your surgery site with a special soap that kills germs.  Give you a special gown to keep you warm. (Cold raises the risk of infection.)  Wear special hair covers, masks, gowns and gloves during surgery.  Give antibiotic medicine, if prescribed. Not all surgeries need antibiotics.  What to bring on the day of surgery  Photo ID and insurance card  Copy of your health care directive, if you have one  Glasses and hearing aids (bring cases)  You can't wear contacts during surgery  Inhaler and  eye drops, if you use them (tell us about these when you arrive)  CPAP machine or breathing device, if you use them  A few personal items, if spending the night  If you have . . .  A pacemaker, ICD (cardiac defibrillator) or other implant: Bring the ID card.  An implanted stimulator: Bring the remote control.  A legal guardian: Bring a copy of the certified (court-stamped) guardianship papers.  Please remove any jewelry, including body piercings. Leave jewelry and other valuables at home.  If you're going home the day of surgery  You must have a responsible adult drive you home. They should stay with you overnight as well.  If you don't have someone to stay with you, and you aren't safe to go home alone, we may keep you overnight. Insurance often won't pay for this.  After surgery  If it's hard to control your pain or you need more pain medicine, please call your surgeon's office.  Questions?   If you have any questions for your care team, list them here: _________________________________________________________________________________________________________________________________________________________________________ ____________________________________ ____________________________________ ____________________________________    How to Take Your Medication Before Surgery  - Take all of your medications before surgery as usual

## 2023-06-21 NOTE — PROGRESS NOTES
[facility] INTERNAL MEDICINE [address]  4TH FLOOR  [facility] [address]  Phone: [phone]  Fax: [phone]  Primary Provider: Michael Conde  Pre-op Performing Provider: TONI ESPITIA      PREOPERATIVE EVALUATION:  Today's date: 6/21/2023    Mairsa Vigil is a 73 year old female who presents for a preoperative evaluation.    Surgical Information:  Surgery/Procedure: CT guided bone biopsy  Surgery Location: [facility]  Surgeon: Not specified  Surgery Date: 7/6  Time of Surgery: 930am  Fax number for surgical facility: Note does not need to be faxed, will be available electronically in Epic.    Assessment & Plan     The proposed surgical procedure is considered LOW risk.    Preop general physical exam  and  Bone lesion of T8    CT guided biopsy scheduled.      Elevated BP without diagnosis of hypertension    BP high in clinic today but she recently had a 24 hour BP monitor that showed her BP was quite good.  She reports white coat HTN.         Antiplatelet or Anticoagulation Medication Instructions:   - aspirin: okay to continue per neurosurgery note    Additional Medication Instructions:  Patient is to take all scheduled medications on the day of surgery    RECOMMENDATION:  APPROVAL GIVEN to proceed with proposed procedure, without further diagnostic evaluation.    33 minutes spent by me on the date of the encounter doing chart review, history and exam, documentation and further activities per the note      Subjective       HPI related to upcoming procedure:  Marisa has bone lesion at T8  seen on MRI and biopsy of T8 is planned for further evaluation.  She is having back pain which she wonders may be related to Reclast.      BP is high today at 161/82. She reports having a 24 hour BP monitor - this was done 6/2 and BP was good.      Marisa is concerned about some abnormal flags on her prior labs.  We reviewed these results, which didn't show anything particularly  concerning.           6/21/2023     8:55 AM   Preop Questions   1. Have you ever had a heart attack or stroke? YES - history TIA with vision changes, no further symtpoms   2. Have you ever had surgery on your heart or blood vessels, such as a stent placement, a coronary artery bypass, or surgery on an artery in your head, neck, heart, or legs? No   3. Do you have chest pain with activity? No   4. Do you have a history of  heart failure? No   5. Do you currently have a cold, bronchitis or symptoms of other infection? No   6. Do you have a cough, shortness of breath, or wheezing? No   7. Do you or anyone in your family have previous history of blood clots? UNKNOWN - brother has thick blood.  No personal blood issues.     8. Do you or does anyone in your family have a serious bleeding problem such as prolonged bleeding following surgeries or cuts? No   9. Have you ever had problems with anemia or been told to take iron pills? No   10. Have you had any abnormal blood loss such as black, tarry or bloody stools, or abnormal vaginal bleeding? No   11. Have you ever had a blood transfusion? No   12. Are you willing to have a blood transfusion if it is medically needed before, during, or after your surgery? Yes   13. Have you or any of your relatives ever had problems with anesthesia? No   14. Do you have sleep apnea, excessive snoring or daytime drowsiness? No   15. Do you have any artifical heart valves or other implanted medical devices like a pacemaker, defibrillator, or continuous glucose monitor? No   16. Do you have artificial joints? YES - R jaw replacement, limits how much her mouth opens   17. Are you allergic to latex? 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    Preoperative Review of :   reviewed - no record of controlled  substances prescribed.      Review of Systems    Answers for HPI/ROS submitted by the patient on 6/21/2023  General Symptoms: No  Skin Symptoms: No  HENT Symptoms: No  EYE SYMPTOMS: No  HEART SYMPTOMS: No  LUNG SYMPTOMS: No  INTESTINAL SYMPTOMS: No  URINARY SYMPTOMS: No  GYNECOLOGIC SYMPTOMS: No  BREAST SYMPTOMS: No  SKELETAL SYMPTOMS: Yes  BLOOD SYMPTOMS: No  NERVOUS SYSTEM SYMPTOMS: No  MENTAL HEALTH SYMPTOMS: No  Bone pain: Yes  Muscle cramps: No  Muscle weakness: Yes  Joint stiffness: No  Bone fracture: No        Patient Active Problem List    Diagnosis Date Noted     AUDREY positive 05/30/2023     Priority: Medium     TIA (transient ischemic attack) 05/30/2023     Priority: Medium     Hyperlipidemia, unspecified hyperlipidemia type 05/30/2023     Priority: Medium     Neck pain 05/30/2023     Priority: Medium     Chronic midline low back pain with right-sided sciatica 05/30/2023     Priority: Medium     Bilateral impacted cerumen 05/30/2023     Priority: Medium     Cataracts, both eyes 05/15/2023     Priority: Medium     Dyspareunia 05/15/2023     Priority: Medium     Dysuria 05/15/2023     Priority: Medium     History of abnormal cervical Papanicolaou smear 05/15/2023     Priority: Medium     History of renal calculi 05/15/2023     Priority: Medium     Leukorrhea 05/15/2023     Priority: Medium     Menopausal syndrome 05/15/2023     Priority: Medium     Senile osteopenia 05/15/2023     Priority: Medium     Urinary tract infectious disease 05/15/2023     Priority: Medium     Uterine leiomyoma 05/15/2023     Priority: Medium     Viral hepatitis A 05/15/2023     Priority: Medium     Back muscle spasm 01/17/2023     Priority: Medium     Traveler's diarrhea 01/17/2023     Priority: Medium     Skin lesion 01/17/2023     Priority: Medium     Malignant melanoma of skin of chest (H) 11/03/2022     Priority: Medium     Right sided abdominal pain 09/16/2022     Priority: Medium     RLQ abdominal pain 09/16/2022     Priority:  Medium     Pulmonary nodules 07/24/2022     Priority: Medium     Travel advice encounter 07/24/2022     Priority: Medium     Thyroid nodule 07/24/2022     Priority: Medium     Hypothyroidism, unspecified type 07/24/2022     Priority: Medium     Age-related osteoporosis without current pathological fracture 07/24/2022     Priority: Medium     TMJ (temporomandibular joint syndrome) 07/24/2022     Priority: Medium     Gastroesophageal reflux disease without esophagitis 07/24/2022     Priority: Medium     Breast pain, left 07/24/2022     Priority: Medium     Elevated blood pressure reading without diagnosis of hypertension 07/24/2022     Priority: Medium     Non-toxic multinodular goiter 10/25/2021     Priority: Medium     Sensorineural hearing loss (SNHL) of both ears 10/25/2021     Priority: Medium     Chest pain 02/16/2021     Priority: Medium     Osteoporosis 12/31/2017     Priority: Medium     Formatting of this note might be different from the original.  Diagnosed 2016 by screening BMD performed due to family history of osteoporosis in a grandparent.    Treatment  Prolia 0224-8471 5 injections, held due to TMJ replacement surgery.  Forteo for 26 days, 2021 stopped due to face swelling  Alendronate 70 mg weekly 7/2022 anticipate 5 years therapy.Stopped due to heartburn after 3 pills  Reclast 5 mg yearly, scheduled 12/2022 due to some dental work, aniticpate 3 yrs therapy. Then probable drug holiday    Fractures  None    Additional history  None    Last Assessment & Plan:   Formatting of this note might be different from the original.  Discussed plan for return and BMD in 2 years.  Sent info about reclast discussed side effects and rare complications.       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     rigth left arytenoid cartilage, paramedian displacement of left true vocal cord     Cataract      Chronic osteoarthritis 2021, 22    Lots of tests     Gastroesophageal reflux disease without esophagitis      H/O degenerative disc disease      Hiatal hernia      Hyperlipidemia 2022    Jaw was repaired; eating again     Hypertension     Situational     Melanoma of skin (H)     s/p resection     Non-toxic multinodular goiter      Osteoarthritis      Pulmonary nodules      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     TMJ (temporomandibular joint syndrome) 01/11/2022    right jaw     Past Surgical History:   Procedure Laterality Date     COLONOSCOPY      2005, 2014     deviated sep       EXCISION / BIOPSY BREAST / NIPPLE / DUCT  2013    right breast duct glands removed     EYE SURGERY  2020    left eyelid     HEART CATH LEFT HEART CATH  01/27/2021    Normal     JOINT REPLACEMENT  Jan 2022    R jaw total replacement Dr. Saldaña, [facility]     LASIK  1998     LITHOTRIPSY  2007     DC BREAST AUGMENTATION  2014     DC FACE LIFT      partial     REPAIR PTOSIS  2009    Partial face lift     SURGICAL PATHOLOGY EXAM      left hip lipoma excision     WISDOM TOOTH EXTRACTION  1969     Current Outpatient Medications   Medication Sig Dispense Refill     aspirin 81 MG EC tablet Take 1 tablet (81 mg) by mouth daily       atorvastatin (LIPITOR) 20 MG tablet Take 1 tablet (20 mg) by mouth daily 90 tablet 1     calcium citrate and vitamin D (CITRACAL) 200-250 MG-UNIT TABS per tablet Take 1 tablet by mouth 2 times daily       levothyroxine (SYNTHROID/LEVOTHROID) 88 MCG tablet Take 1 tablet (88 mcg) by mouth daily Every day but Sunday 90 tablet 3     multivitamin w/minerals (THERA-VIT-M) tablet Take 1  "tablet by mouth daily       methocarbamol (ROBAXIN) 500 MG tablet Take 1 tablet (500 mg) by mouth nightly as needed for muscle spasms (Patient not taking: Reported on 6/21/2023) 30 tablet 1     omeprazole (PRILOSEC) 40 MG DR capsule Take 1 capsule (40 mg) by mouth daily as needed (Patient not taking: Reported on 6/21/2023) 30 capsule 1       Allergies   Allergen Reactions     Clindamycin Blisters     Vancomycin Rash     Versed [Midazolam] Dizziness     Pt reports memory issues     Fosamax [Alendronate]      Heart burn     Parathyroid Hormone (Recomb) Other (See Comments)     Facial swelling on 3/2022        Social History     Tobacco Use     Smoking status: Never     Smokeless tobacco: Never     Tobacco comments:     Never used tobacco products   Substance Use Topics     Alcohol use: Yes     Alcohol/week: 1.0 standard drink of alcohol     Types: 1 Standard drinks or equivalent per week     Comment: Not often       History   Drug Use Unknown         Objective     BP (!) 161/82 (BP Location: Right arm, Patient Position: Sitting, Cuff Size: Adult Regular)   Pulse 69   Ht 1.54 m (5' 0.63\")   Wt 56.2 kg (123 lb 12.8 oz)   SpO2 99%   BMI 23.68 kg/m      Physical Exam      GENERAL APPEARANCE: healthy, alert and no distress     HENT: normal ear canals and TMs, nose and mouth without ulcers or lesions     NECK: no adenopathy, no asymmetry, masses, or scars     RESP: lungs clear to auscultation - no rales, rhonchi or wheezes     CV: regular rates and rhythm, normal S1 S2, no S3 or S4 and no murmur, click or rub -     ABDOMEN:  soft, nontender, no HSM or masses and bowel sounds normal     MS: extremities normal- no gross deformities noted, no evidence of inflammation in joints       NEURO: mentation intact and speech normal     PSYCH: mentation appears normal. and affect somewhat anxious     LYMPHATICS: No cervical or supraclavicular nodes     Recent Labs   Lab Test 05/30/23  1451 01/09/23  1454 09/16/22  1444   HGB  --  " 13.7 13.7   PLT  --  264 268   INR 0.95  --   --     138 133*   POTASSIUM 4.0 4.0 4.4   CR 0.81 0.79 0.83        Diagnostics:  No labs were ordered during this visit.   No EKG required for low risk surgery (cataract, skin procedure, breast biopsy, etc).    Revised Cardiac Risk Index (RCRI):  The patient has the following serious cardiovascular risks for perioperative complications:   - Cerebrovascular Disease (TIA or CVA) = 1 point     RCRI Interpretation: 1 point: Class II (low risk - 0.9% complication rate)           Signed Electronically by: Gabriele Baker MD  Copy of this evaluation report is provided to requesting physician.

## 2023-06-23 ENCOUNTER — OFFICE VISIT (OUTPATIENT)
Dept: ORTHOPEDICS | Facility: CLINIC | Age: 73
End: 2023-06-23
Payer: MEDICARE

## 2023-06-23 DIAGNOSIS — M25.551 GREATER TROCHANTERIC PAIN SYNDROME OF RIGHT LOWER EXTREMITY: Primary | ICD-10-CM

## 2023-06-23 DIAGNOSIS — S76.011A TEAR OF GLUTEUS MINIMUS TENDON, RIGHT, INITIAL ENCOUNTER: ICD-10-CM

## 2023-06-23 PROCEDURE — 20611 DRAIN/INJ JOINT/BURSA W/US: CPT | Mod: RT | Performed by: FAMILY MEDICINE

## 2023-06-23 PROCEDURE — 99213 OFFICE O/P EST LOW 20 MIN: CPT | Mod: 25 | Performed by: FAMILY MEDICINE

## 2023-06-23 RX ORDER — TRIAMCINOLONE ACETONIDE 40 MG/ML
40 INJECTION, SUSPENSION INTRA-ARTICULAR; INTRAMUSCULAR
Status: SHIPPED | OUTPATIENT
Start: 2023-06-23

## 2023-06-23 RX ORDER — LIDOCAINE HYDROCHLORIDE 10 MG/ML
4 INJECTION, SOLUTION EPIDURAL; INFILTRATION; INTRACAUDAL; PERINEURAL
Status: SHIPPED | OUTPATIENT
Start: 2023-06-23

## 2023-06-23 RX ADMIN — LIDOCAINE HYDROCHLORIDE 4 ML: 10 INJECTION, SOLUTION EPIDURAL; INFILTRATION; INTRACAUDAL; PERINEURAL at 15:43

## 2023-06-23 RX ADMIN — TRIAMCINOLONE ACETONIDE 40 MG: 40 INJECTION, SUSPENSION INTRA-ARTICULAR; INTRAMUSCULAR at 15:43

## 2023-06-23 NOTE — NURSING NOTE
[address] [address]  Dept: [phone]  ______________________________________________________________________________    Patient: Marisa Vigil   : 1950   MRN: [id number]   2023    INVASIVE PROCEDURE SAFETY CHECKLIST    Date: 2023   Procedure:Right greater troch USG CSI   Patient Name: Marisa Vigil  MRN: [id number]  YOB: 1950    Action: Complete sections as appropriate. Any discrepancy results in a HARD COPY until resolved.     PRE PROCEDURE:  Patient ID verified with 2 identifiers (name and  or MRN): Yes  Procedure and site verified with patient/designee (when able): Yes  Accurate consent documentation in medical record: Yes  H&P (or appropriate assessment) documented in medical record: Yes  H&P must be up to 20 days prior to procedure and updates within 24 hours of procedure as applicable: Yes  Relevant diagnostic and radiology test results appropriately labeled and displayed as applicable: Yes  Procedure site(s) marked with provider initials: NA    TIMEOUT:  Time-Out performed immediately prior to starting procedure, including verbal and active participation of all team members addressing the following:Yes  * Correct patient identify  * Confirmed that the correct side and site are marked  * An accurate procedure consent form  * Agreement on the procedure to be done  * Correct patient position  * Relevant images and results are properly labeled and appropriately displayed  * The need to administer antibiotics or fluids for irrigation purposes during the procedure as applicable   * Safety precautions based on patient history or medication use    DURING PROCEDURE: Verification of correct person, site, and procedures any time the responsibility for care of the patient is transferred to another member of the care team.       Prior to injection, verified patient identity using patient's name and date of  birth.  Due to injection administration, patient instructed to remain in clinic for 15 minutes  afterwards, and to report any adverse reaction to me immediately.    Bursa injection was performed.      Drug Amount Wasted:  None.  Vial/Syringe: Single dose vial  Expiration Date:  12/1/26 3/1/25      Jailyn Byrne, [facility]  June 23, 2023

## 2023-06-23 NOTE — LETTER
6/23/2023      RE: Marisa Vigil  [address]   [address]  [place] [address]     Dear Colleague,    Thank you for referring your patient, Marisa Vigil, to the [facility] [place]. Please see a copy of my visit note below.    [facility]  Sports Medicine Clinic  Clinics and Surgery Center              SUBJECTIVE       Marisa Vigil is a 73 year old female  With history of piriformis syndrome presenting to clinic today for Greater trochanteric pain syndrome of right lower extremity and CSI. Referred by Dr. Greene for greater trochanteric CSI and ischial tuberosity injection with MSK .      PMH, Medications and Allergies were reviewed and updated as needed.    ROS:  As noted above otherwise negative.    Patient Active Problem List   Diagnosis    Pulmonary nodules    Travel advice encounter    Thyroid nodule    Hypothyroidism, unspecified type    Age-related osteoporosis without current pathological fracture    TMJ (temporomandibular joint syndrome)    Gastroesophageal reflux disease without esophagitis    Breast pain, left    Elevated blood pressure reading without diagnosis of hypertension    Right sided abdominal pain    RLQ abdominal pain    Back muscle spasm    Traveler's diarrhea    Skin lesion    Cataracts, both eyes    Chest pain    Dry eyes    Dyspareunia    Dysuria    History of abnormal cervical Papanicolaou smear    History of renal calculi    Leukorrhea    Malignant melanoma of skin of chest (H)    Menopausal syndrome    Non-toxic multinodular goiter    Osteoporosis    Other diseases of nasal cavity and sinuses(478.19)    Pain in female pelvis    Abscess of tonsil    Senile osteopenia    Sensorineural hearing loss (SNHL) of both ears    Swelling of breast    Urinary tract infectious disease    Uterine leiomyoma    Viral hepatitis A    AUDREY positive    TIA (transient ischemic attack)    Hyperlipidemia, unspecified hyperlipidemia type    Neck  pain    Chronic midline low back pain with right-sided sciatica    Bilateral impacted cerumen       Current Outpatient Medications   Medication Sig Dispense Refill    aspirin 81 MG EC tablet Take 1 tablet (81 mg) by mouth daily      atorvastatin (LIPITOR) 20 MG tablet Take 1 tablet (20 mg) by mouth daily 90 tablet 1    calcium citrate and vitamin D (CITRACAL) 200-250 MG-UNIT TABS per tablet Take 1 tablet by mouth 2 times daily      levothyroxine (SYNTHROID/LEVOTHROID) 88 MCG tablet Take 1 tablet (88 mcg) by mouth daily Every day but Sunday 90 tablet 3    methocarbamol (ROBAXIN) 500 MG tablet Take 1 tablet (500 mg) by mouth nightly as needed for muscle spasms (Patient not taking: Reported on 6/21/2023) 30 tablet 1    multivitamin w/minerals (THERA-VIT-M) tablet Take 1 tablet by mouth daily      omeprazole (PRILOSEC) 40 MG DR capsule Take 1 capsule (40 mg) by mouth daily as needed (Patient not taking: Reported on 6/21/2023) 30 capsule 1            OBJECTIVE:       Vitals: There were no vitals filed for this visit.  BMI: There is no height or weight on file to calculate BMI.    Gen:  Well nourished and in no acute distress  HEENT: Extraocular movement intact  Neck: Supple  Pulm:  Breathing Comfortably. No increased respiratory effort.  Psych: Euthymic. Appropriately answers questions    MSK:   No TTP over ischial tuberosity. +TTP over greater trochanter. +Piriformis sign.    MRI hip - 6/10/23     FINDINGS:  There is artifact which deteriorates image quality along the inferior aspect of the field-of-view.     RIGHT HIP:   -Labrum: Short segment degenerative tear along the anterosuperior labrum involving the chondral labral junction. No paralabral cyst.   -Cartilage: Localized partial-thickness cartilage loss and fissuring along the anterosuperior acetabular rim near the chondral labral junction. Otherwise, there is no focal cartilage defects. No subchondral bone marrow edema.  -Joint space: Small amount of joint fluid.  No discrete loose bodies.   -Joint capsule/ligaments: Intact joint capsule. Ligamentum teres is diminutive but intact.     MUSCLES AND TENDONS:   -Gluteal: There is advanced tendinopathy of the right gluteus minimus tendon with a high-grade tear at the trochanteric insertion with adjacent greater trochanteric bursitis. Advanced gluteus medius tendinopathy without a discrete tear.  -Proximal hamstring: No high-grade tear. No edema about the ischial tuberosity.  -Iliopsoas: No tendon tear or tendinopathy. No bursitis.  -Rectus femoris origin: No tear or tendinopathy.      BONES:   -There is no evidence of a fracture or bony stress reaction about the right hip.   -Visualized right SI joint is maintained.     SOFT TISSUES:   -Normal muscle bulk. No acute muscular injury.     INTRA-PELVIC CONTENTS:  -Visualized portions are normal.                                                                    IMPRESSION:  1.  Right greater trochanteric bursitis with a high-grade tear of the right gluteus minimus tendon. Advanced tendinopathy of both gluteus minimus and medius.   2.  Mild degenerative changes of the right hip with a short segment anterosuperior labral tear.         ASSESSMENT and PLAN:     Marisa was seen today for pain.    Diagnoses and all orders for this visit:    Greater trochanteric pain syndrome of right lower extremity  -     POC US GUIDANCE NEEDLE PLACEMENT    Tear of gluteus minimus tendon, right, initial encounter  -     POC US GUIDANCE NEEDLE PLACEMENT    Other orders  -     Large Joint Injection/Arthocentesis: R greater trochanteric bursa  -     Large Joint Injection/Arthocentesis      Marisa Vigil is a 73 year old female  With history of piriformis syndrome presenting to clinic today for Greater trochanteric pain syndrome of right lower extremity and US guided CSI. Elected to not perform CSI of ischial tuberosity bursa as it was non-ttp and no bursitis seen on ultrasound. MRI did not show this  either.    Right greater trochanter bursa Injection - Ultrasound Guided  The patient was informed of the risks and the benefits of the procedure and a written consent was signed.  The patient s right lateral hip was prepped with chlorhexidine in sterile fashion.   40 mg of kenalog suspension was drawn up into a 5 mL syringe with 4 mL of 1% lidocaine w/o Epi.  Injection was performed using sterile technique.  Under ultrasound guidance a 3.5-inch 22-gauge needle was used to enter the Right greater trochanter bursa. the patient was in the lateral recumbent position. Needle placement was visualized and documented with ultrasound.  Ultrasound visualization was necessary due to the complex anatomy. Injection performed long axis to the probe.  Injection solution visualized within the bursa.  Images were permanently stored for the patient's record.  There were no complications. The patient tolerated the procedure well. There was negligible bleeding.   Pain was 8/10 prior to injection. 4/10 with walking down hallway afterwards. Scheduled follow up in four weeks to check progress as she will address piriformis issues with rehab. If still with posterior buttock pain and no improvement with piriformis syndrome component, will possibly trial injection of piriformis.  Therapy scheduled to follow for mobilization.  The patient was instructed to call or go to the emergency room with any unusual pain, swelling, redness, or if otherwise concerned.  Return sooner if develops new or worsening symptoms.    Options for treatment and/or follow-up care were reviewed with the patient was actively involved in the decision making process. Patient verbalized understanding and was in agreement with the plan.    The patient was seen by and discussed with attending physician Dr.Suzanne DIDI Chavez MD, CAQ, CCD, who agrees with the plan as stated unless otherwise stated.     Oleksandr Mary DO  Primary Care Sports Medicine Fellow      Large Joint  Injection/Arthocentesis: R greater trochanteric bursa    Date/Time: 6/23/2023 3:43 PM    Performed by: Denise Chavez MD  Authorized by: Denise Chavez MD    Indications:  Pain  Needle Size:  22 G  Guidance: ultrasound    Approach:  Lateral  Location:  Hip      Site:  R greater trochanteric bursa  Medications:  40 mg triamcinolone 40 MG/ML; 4 mL lidocaine (PF) 1 %  Outcome:  Tolerated well, no immediate complications  Procedure discussed: discussed risks, benefits, and alternatives    Consent Given by:  Patient  Timeout: timeout called immediately prior to procedure    Prep: patient was prepped and draped in usual sterile fashion        Oleksandr Mary DO, Sports Medicine Fellow      Attending Note:   I have   examined this patient/images and have reviewed the clinical presentation and progress note with the fellow. I agree with the treatment plan as outlined. The plan was formulated with the fellow on the day of the patient's visit. I have directly supervised the MSK US guided greater trochanter CSI.   Denise Chavez MD, CAQ, CCD  [facility]  Sports Medicine and Bone Health

## 2023-06-23 NOTE — PROGRESS NOTES
Attending Note:   I have   examined this patient/images and have reviewed the clinical presentation and progress note with the fellow. I agree with the treatment plan as outlined. The plan was formulated with the fellow on the day of the patient's visit. I have directly supervised the MSK US guided greater trochanter CSI.   Denise Chavez MD, CAQ, CCD  [facility]  Sports Medicine and Bone Health

## 2023-06-23 NOTE — PROGRESS NOTES
[facility]  [facility]  [facility]              SUBJECTIVE       Marisa Vigil is a 73 year old female  With history of piriformis syndrome presenting to clinic today for Greater trochanteric pain syndrome of right lower extremity and CSI. Referred by Dr. Greene for greater trochanteric CSI and ischial tuberosity injection with MSK US.      PMH, Medications and Allergies were reviewed and updated as needed.    ROS:  As noted above otherwise negative.    Patient Active Problem List   Diagnosis     Pulmonary nodules     Travel advice encounter     Thyroid nodule     Hypothyroidism, unspecified type     Age-related osteoporosis without current pathological fracture     TMJ (temporomandibular joint syndrome)     Gastroesophageal reflux disease without esophagitis     Breast pain, left     Elevated blood pressure reading without diagnosis of hypertension     Right sided abdominal pain     RLQ abdominal pain     Back muscle spasm     Traveler's diarrhea     Skin lesion     Cataracts, both eyes     Chest pain     Dry eyes     Dyspareunia     Dysuria     History of abnormal cervical Papanicolaou smear     History of renal calculi     Leukorrhea     Malignant melanoma of skin of chest (H)     Menopausal syndrome     Non-toxic multinodular goiter     Osteoporosis     Other diseases of nasal cavity and sinuses(478.19)     Pain in female pelvis     Abscess of tonsil     Senile osteopenia     Sensorineural hearing loss (SNHL) of both ears     Swelling of breast     Urinary tract infectious disease     Uterine leiomyoma     Viral hepatitis A     AUDREY positive     TIA (transient ischemic attack)     Hyperlipidemia, unspecified hyperlipidemia type     Neck pain     Chronic midline low back pain with right-sided sciatica     Bilateral impacted cerumen       Current Outpatient Medications   Medication Sig Dispense Refill     aspirin 81 MG EC tablet Take 1 tablet (81 mg) by mouth daily        atorvastatin (LIPITOR) 20 MG tablet Take 1 tablet (20 mg) by mouth daily 90 tablet 1     calcium citrate and vitamin D (CITRACAL) 200-250 MG-UNIT TABS per tablet Take 1 tablet by mouth 2 times daily       levothyroxine (SYNTHROID/LEVOTHROID) 88 MCG tablet Take 1 tablet (88 mcg) by mouth daily Every day but Sunday 90 tablet 3     methocarbamol (ROBAXIN) 500 MG tablet Take 1 tablet (500 mg) by mouth nightly as needed for muscle spasms (Patient not taking: Reported on 6/21/2023) 30 tablet 1     multivitamin w/minerals (THERA-VIT-M) tablet Take 1 tablet by mouth daily       omeprazole (PRILOSEC) 40 MG DR capsule Take 1 capsule (40 mg) by mouth daily as needed (Patient not taking: Reported on 6/21/2023) 30 capsule 1            OBJECTIVE:       Vitals: There were no vitals filed for this visit.  BMI: There is no height or weight on file to calculate BMI.    Gen:  Well nourished and in no acute distress  HEENT: Extraocular movement intact  Neck: Supple  Pulm:  Breathing Comfortably. No increased respiratory effort.  Psych: Euthymic. Appropriately answers questions    MSK:   No TTP over ischial tuberosity. +TTP over greater trochanter. +Piriformis sign.    MRI hip - 6/10/23     FINDINGS:  There is artifact which deteriorates image quality along the inferior aspect of the field-of-view.     RIGHT HIP:   -Labrum: Short segment degenerative tear along the anterosuperior labrum involving the chondral labral junction. No paralabral cyst.   -Cartilage: Localized partial-thickness cartilage loss and fissuring along the anterosuperior acetabular rim near the chondral labral junction. Otherwise, there is no focal cartilage defects. No subchondral bone marrow edema.  -Joint space: Small amount of joint fluid. No discrete loose bodies.   -Joint capsule/ligaments: Intact joint capsule. Ligamentum teres is diminutive but intact.     MUSCLES AND TENDONS:   -Gluteal: There is advanced tendinopathy of the right gluteus minimus tendon with  a high-grade tear at the trochanteric insertion with adjacent greater trochanteric bursitis. Advanced gluteus medius tendinopathy without a discrete tear.  -Proximal hamstring: No high-grade tear. No edema about the ischial tuberosity.  -Iliopsoas: No tendon tear or tendinopathy. No bursitis.  -Rectus femoris origin: No tear or tendinopathy.      BONES:   -There is no evidence of a fracture or bony stress reaction about the right hip.   -Visualized right SI joint is maintained.     SOFT TISSUES:   -Normal muscle bulk. No acute muscular injury.     INTRA-PELVIC CONTENTS:  -Visualized portions are normal.                                                                    IMPRESSION:  1.  Right greater trochanteric bursitis with a high-grade tear of the right gluteus minimus tendon. Advanced tendinopathy of both gluteus minimus and medius.   2.  Mild degenerative changes of the right hip with a short segment anterosuperior labral tear.         ASSESSMENT and PLAN:     Marisa was seen today for pain.    Diagnoses and all orders for this visit:    Greater trochanteric pain syndrome of right lower extremity  -     POC US GUIDANCE NEEDLE PLACEMENT    Tear of gluteus minimus tendon, right, initial encounter  -     POC US GUIDANCE NEEDLE PLACEMENT    Other orders  -     Large Joint Injection/Arthocentesis: R greater trochanteric bursa  -     Large Joint Injection/Arthocentesis      Marisa Vigil is a 73 year old female  With history of piriformis syndrome presenting to clinic today for Greater trochanteric pain syndrome of right lower extremity and US guided CSI. Elected to not perform CSI of ischial tuberosity bursa as it was non-ttp and no bursitis seen on ultrasound. MRI did not show this either.    Right greater trochanter bursa Injection - Ultrasound Guided  The patient was informed of the risks and the benefits of the procedure and a written consent was signed.  The patient s right lateral hip was prepped with  chlorhexidine in sterile fashion.   40 mg of kenalog suspension was drawn up into a 5 mL syringe with 4 mL of 1% lidocaine w/o Epi.  Injection was performed using sterile technique.  Under ultrasound guidance a 3.5-inch 22-gauge needle was used to enter the Right greater trochanter bursa. the patient was in the lateral recumbent position. Needle placement was visualized and documented with ultrasound.  Ultrasound visualization was necessary due to the complex anatomy. Injection performed long axis to the probe.  Injection solution visualized within the bursa.  Images were permanently stored for the patient's record.  There were no complications. The patient tolerated the procedure well. There was negligible bleeding.   Pain was 8/10 prior to injection. 4/10 with walking down hallway afterwards. Scheduled follow up in four weeks to check progress as she will address piriformis issues with rehab. If still with posterior buttock pain and no improvement with piriformis syndrome component, will possibly trial injection of piriformis.  Therapy scheduled to follow for mobilization.  The patient was instructed to call or go to the emergency room with any unusual pain, swelling, redness, or if otherwise concerned.  Return sooner if develops new or worsening symptoms.    Options for treatment and/or follow-up care were reviewed with the patient was actively involved in the decision making process. Patient verbalized understanding and was in agreement with the plan.    The patient was seen by and discussed with attending physician Dr.Suzanne DIDI Chavez MD, CAQ, CCD, who agrees with the plan as stated unless otherwise stated.     Oleksandr Mary DO  Primary Care Sports Medicine Fellow      Large Joint Injection/Arthocentesis: R greater trochanteric bursa    Date/Time: 6/23/2023 3:43 PM    Performed by: Denise Chavez MD  Authorized by: Denise Chavez MD    Indications:  Pain  Needle Size:  22  G  Guidance: ultrasound    Approach:  Lateral  Location:  Hip      Site:  R greater trochanteric bursa  Medications:  40 mg triamcinolone 40 MG/ML; 4 mL lidocaine (PF) 1 %  Outcome:  Tolerated well, no immediate complications  Procedure discussed: discussed risks, benefits, and alternatives    Consent Given by:  Patient  Timeout: timeout called immediately prior to procedure    Prep: patient was prepped and draped in usual sterile fashion        Oleksandr Mary DO, Sports Medicine Fellow

## 2023-06-26 ENCOUNTER — TELEPHONE (OUTPATIENT)
Dept: OPHTHALMOLOGY | Facility: CLINIC | Age: 73
End: 2023-06-26
Payer: MEDICARE

## 2023-06-26 NOTE — TELEPHONE ENCOUNTER
[facility] Call Center    Phone Message    May a detailed message be left on voicemail: yes     Reason for Call: Symptoms or Concerns     If patient has red-flag symptoms, warm transfer to triage line    Current symptom or concern: Pt states that since Wednesday, she has had an area of swelling, redness, pain, and pus in the outer corner of her Rt eye.    Symptoms have been present for:  5 day(s)    Has patient previously been seen for this? No    By : Dr. Aguilar    Date: 5/16/23    Are there any new or worsening symptoms? No      Action Taken: Message routed to:  [facility] ([facility] EYE    Travel Screening: Not Applicable

## 2023-06-26 NOTE — TELEPHONE ENCOUNTER
The patient reports a RUL stye.  She notes that is is worsening but it seems to be coming to a head.  She is concerned because of surgical procedure scheduled for next week.  Patient is using warm compresses.  The patient is scheduled this week.  She will call if symptoms worsen such as spreading inflammation, fever, or vision change.

## 2023-06-30 ENCOUNTER — OFFICE VISIT (OUTPATIENT)
Dept: OPHTHALMOLOGY | Facility: CLINIC | Age: 73
End: 2023-06-30
Attending: STUDENT IN AN ORGANIZED HEALTH CARE EDUCATION/TRAINING PROGRAM
Payer: MEDICARE

## 2023-06-30 DIAGNOSIS — H02.886 MEIBOMIAN GLAND DYSFUNCTION (MGD) OF BOTH EYES: ICD-10-CM

## 2023-06-30 DIAGNOSIS — H00.11 CHALAZION OF RIGHT UPPER EYELID: Primary | ICD-10-CM

## 2023-06-30 DIAGNOSIS — H02.883 MEIBOMIAN GLAND DYSFUNCTION (MGD) OF BOTH EYES: ICD-10-CM

## 2023-06-30 PROCEDURE — 99213 OFFICE O/P EST LOW 20 MIN: CPT | Performed by: STUDENT IN AN ORGANIZED HEALTH CARE EDUCATION/TRAINING PROGRAM

## 2023-06-30 PROCEDURE — G0463 HOSPITAL OUTPT CLINIC VISIT: HCPCS | Performed by: STUDENT IN AN ORGANIZED HEALTH CARE EDUCATION/TRAINING PROGRAM

## 2023-06-30 RX ORDER — ERYTHROMYCIN 5 MG/G
0.5 OINTMENT OPHTHALMIC AT BEDTIME
Qty: 7 G | Refills: 1 | Status: SHIPPED | OUTPATIENT
Start: 2023-06-30 | End: 2023-10-26

## 2023-06-30 ASSESSMENT — CONF VISUAL FIELD
OD_SUPERIOR_TEMPORAL_RESTRICTION: 0
METHOD: COUNTING FINGERS
OD_INFERIOR_TEMPORAL_RESTRICTION: 0
OS_SUPERIOR_TEMPORAL_RESTRICTION: 0
OD_NORMAL: 1
OD_SUPERIOR_NASAL_RESTRICTION: 0
OS_INFERIOR_TEMPORAL_RESTRICTION: 0
OS_SUPERIOR_NASAL_RESTRICTION: 0
OS_INFERIOR_NASAL_RESTRICTION: 0
OD_INFERIOR_NASAL_RESTRICTION: 0
OS_NORMAL: 1

## 2023-06-30 ASSESSMENT — EXTERNAL EXAM - RIGHT EYE: OD_EXAM: NORMAL

## 2023-06-30 ASSESSMENT — VISUAL ACUITY
OD_CC: 20/25
OS_CC+: -2
OS_CC: 20/30
OS_PH_CC: 20/25
METHOD: SNELLEN - LINEAR

## 2023-06-30 ASSESSMENT — TONOMETRY
IOP_METHOD: TONOPEN
OS_IOP_MMHG: 13
OD_IOP_MMHG: 15

## 2023-06-30 ASSESSMENT — REFRACTION_WEARINGRX
OD_CYLINDER: +0.25
OS_SPHERE: -2.00
OD_ADD: +2.50
OD_AXIS: 178
OD_SPHERE: +1.25
OS_CYLINDER: SPHERE
OS_ADD: +2.50

## 2023-06-30 ASSESSMENT — EXTERNAL EXAM - LEFT EYE: OS_EXAM: NORMAL

## 2023-06-30 ASSESSMENT — SLIT LAMP EXAM - LIDS: COMMENTS: MGD

## 2023-06-30 NOTE — PROGRESS NOTES
HPI    Pt states she has stye on the RUL X 1 1/2 weeks  Has used WC and EES oint   No flashes, floaters eye pain or AM crusting    Anabel Pat COT 7:09 AM June 30, 2023       Last edited by Anabel Pat on 6/30/2023  7:10 AM.          Review of systems for the eyes was negative other than the pertinent positives/negatives listed in the HPI.    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     rigth left arytenoid cartilage, paramedian displacement of left true vocal cord     Cataract      Chronic osteoarthritis 2021, 23    Lots of tests     Gastroesophageal reflux disease without esophagitis      H/O degenerative disc disease      Hiatal hernia      Hyperlipidemia 2022    Jaw was repaired; eating again     Hypertension     Situational     Melanoma of skin (H)     s/p resection     Non-toxic multinodular goiter      Osteoarthritis      Pulmonary nodules      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     TMJ (temporomandibular joint syndrome) 01/11/2022    right jaw         Assessment & Plan      Marisa Vigil is a 73 year old female with the following diagnoses:    Right upper eyelid chalazion  MGD/Dry eyes OU  - PFATs QID and prn OU  - warm compresses at least 4 times a day OU x 5-10 min each time  - erythromycin ophthalmic ointment at bedtime OD x 30 days (Rx provided)    Nuclear sclerotic cataract OU  - not affecting ADLs  - observe    History of LASIK OU  - in 1998  - observe    Posterior Vitreous Detachment OU  - noted previously  - no new flashes, floaters, or curtain down visual field  - not dilated today  - RD precautions    Glaucoma suspect OU  - saw Dr Aguilar  - family history of glaucoma  - will return to complete  "glaucoma work up    History of Transient left sided visual loss  - Per Dr Aguilar note: \"Patient was seen at the [facility] ER on 05/13/2023 for a TIA. Patient presents to the [facility] ophthalmology clinic today 05/16/2023 for follow up of TIA.  On 05/13/2023, patient complains of seeing letters disappearing on the left side of the visual field for 5-10 minutes, unsure which eye. Only one episode of transient vision loss, On chart review, MRI/MRA head and neck was done 05/13/2023 at the ED - negative for an acute stroke. Stroke neurologist feels it is migraine aura vs TIA. Recommends outpatient TIA work up to include HA1C, lipid panel, and an echo. Patient was discharged with daily aspirin\"  - no recurrence since  - return precautions reviewed    Drusen OU  - father with AMD  - patient is a nonsmoker  - green leafy vegetables  - amsler precautions    Epiretinal membrane, left eye  - Per Dr Aguilar, mild and noted on OCT macula  - amsler precautions    Counseled return/RD precautions    Patient disposition:   Return in about 2 months (around 8/30/2023) for Follow Up 2 months.      Attending Physician Attestation:  Complete documentation of historical and exam elements from today's encounter can be found in the full encounter summary report (not reduplicated in this progress note).  I personally obtained the chief complaint(s) and history of present illness.  I confirmed and edited as necessary the review of systems, past medical/surgical history, family history, social history, and examination findings as documented by others; and I examined the patient myself.  I personally reviewed the relevant tests, images, and reports as documented above.  I formulated and edited as necessary the assessment and plan and discussed the findings and management plan with the patient and family. . - Jordana Peraza MD      "

## 2023-06-30 NOTE — NURSING NOTE
Chief Complaints and History of Present Illnesses   Patient presents with     Stye / Hordeolum Evaluation     Chief Complaint(s) and History of Present Illness(es)     Stye / Hordeolum Evaluation           Comments    Pt states she has stye on the RUL X 1 1/2 weeks  Has used WC and EES oint   No flashes, floaters eye pain or AM crusting    Anabel Pat COT 7:09 AM June 30, 2023

## 2023-07-06 ENCOUNTER — HOSPITAL ENCOUNTER (OUTPATIENT)
Facility: CLINIC | Age: 73
Discharge: HOME | End: 2023-07-06
Attending: INTERNAL MEDICINE | Admitting: RADIOLOGY
Payer: MEDICARE

## 2023-07-06 ENCOUNTER — APPOINTMENT (OUTPATIENT)
Dept: INTERVENTIONAL RADIOLOGY/VASCULAR | Facility: CLINIC | Age: 73
End: 2023-07-06
Attending: HOSPITALIST
Payer: MEDICARE

## 2023-07-06 ENCOUNTER — DOCUMENTATION ONLY (OUTPATIENT)
Dept: INTERNAL MEDICINE | Facility: CLINIC | Age: 73
End: 2023-07-06

## 2023-07-06 ENCOUNTER — APPOINTMENT (OUTPATIENT)
Dept: MEDSURG UNIT | Facility: CLINIC | Age: 73
End: 2023-07-06
Attending: INTERNAL MEDICINE
Payer: MEDICARE

## 2023-07-06 VITALS
WEIGHT: 120 LBS | BODY MASS INDEX: 22.66 KG/M2 | OXYGEN SATURATION: 98 % | DIASTOLIC BLOOD PRESSURE: 77 MMHG | SYSTOLIC BLOOD PRESSURE: 132 MMHG | TEMPERATURE: 98 F | RESPIRATION RATE: 16 BRPM | HEIGHT: 61 IN | HEART RATE: 67 BPM

## 2023-07-06 DIAGNOSIS — M89.9 LESION OF THORACIC VERTEBRA: ICD-10-CM

## 2023-07-06 DIAGNOSIS — M89.9 LESION OF BONE OF THORACIC SPINE: Primary | ICD-10-CM

## 2023-07-06 DIAGNOSIS — M89.9 LESION OF BONE OF THORACIC SPINE: ICD-10-CM

## 2023-07-06 LAB
ERYTHROCYTE [DISTWIDTH] IN BLOOD BY AUTOMATED COUNT: 12.4 % (ref 10–15)
HCT VFR BLD AUTO: 42.9 % (ref 35–47)
HGB BLD-MCNC: 14.2 G/DL (ref 11.7–15.7)
INR PPP: 0.94 (ref 0.85–1.15)
MCH RBC QN AUTO: 29.6 PG (ref 26.5–33)
MCHC RBC AUTO-ENTMCNC: 33.1 G/DL (ref 31.5–36.5)
MCV RBC AUTO: 89 FL (ref 78–100)
PLATELET # BLD AUTO: 259 10E3/UL (ref 150–450)
RBC # BLD AUTO: 4.8 10E6/UL (ref 3.8–5.2)
WBC # BLD AUTO: 10.1 10E3/UL (ref 4–11)

## 2023-07-06 PROCEDURE — 88307 TISSUE EXAM BY PATHOLOGIST: CPT | Mod: 26 | Performed by: PATHOLOGY

## 2023-07-06 PROCEDURE — 20225 BONE BIOPSY TROCAR/NDL DEEP: CPT | Performed by: RADIOLOGY

## 2023-07-06 PROCEDURE — 999N000142 HC STATISTIC PROCEDURE PREP ONLY

## 2023-07-06 PROCEDURE — 85027 COMPLETE CBC AUTOMATED: CPT | Performed by: NURSE PRACTITIONER

## 2023-07-06 PROCEDURE — 272N000509 HC NEEDLE CR9

## 2023-07-06 PROCEDURE — 99152 MOD SED SAME PHYS/QHP 5/>YRS: CPT | Performed by: RADIOLOGY

## 2023-07-06 PROCEDURE — 36415 COLL VENOUS BLD VENIPUNCTURE: CPT | Performed by: NURSE PRACTITIONER

## 2023-07-06 PROCEDURE — 250N000011 HC RX IP 250 OP 636: Mod: JZ | Performed by: RADIOLOGY

## 2023-07-06 PROCEDURE — 99152 MOD SED SAME PHYS/QHP 5/>YRS: CPT

## 2023-07-06 PROCEDURE — 250N000011 HC RX IP 250 OP 636

## 2023-07-06 PROCEDURE — 77012 CT SCAN FOR NEEDLE BIOPSY: CPT | Mod: 26 | Performed by: RADIOLOGY

## 2023-07-06 PROCEDURE — 88307 TISSUE EXAM BY PATHOLOGIST: CPT | Mod: TC | Performed by: HOSPITALIST

## 2023-07-06 PROCEDURE — 258N000003 HC RX IP 258 OP 636: Performed by: NURSE PRACTITIONER

## 2023-07-06 PROCEDURE — 85610 PROTHROMBIN TIME: CPT | Mod: GZ | Performed by: NURSE PRACTITIONER

## 2023-07-06 PROCEDURE — 250N000009 HC RX 250

## 2023-07-06 PROCEDURE — 999N000132 HC STATISTIC PP CARE STAGE 1

## 2023-07-06 RX ORDER — NALOXONE HYDROCHLORIDE 0.4 MG/ML
0.4 INJECTION, SOLUTION INTRAMUSCULAR; INTRAVENOUS; SUBCUTANEOUS
Status: DISCONTINUED | OUTPATIENT
Start: 2023-07-06 | End: 2023-07-06 | Stop reason: HOSPADM

## 2023-07-06 RX ORDER — LIDOCAINE 40 MG/G
CREAM TOPICAL
Status: DISCONTINUED | OUTPATIENT
Start: 2023-07-06 | End: 2023-07-06 | Stop reason: HOSPADM

## 2023-07-06 RX ORDER — NALOXONE HYDROCHLORIDE 0.4 MG/ML
0.2 INJECTION, SOLUTION INTRAMUSCULAR; INTRAVENOUS; SUBCUTANEOUS
Status: DISCONTINUED | OUTPATIENT
Start: 2023-07-06 | End: 2023-07-06 | Stop reason: HOSPADM

## 2023-07-06 RX ORDER — SODIUM CHLORIDE 9 MG/ML
INJECTION, SOLUTION INTRAVENOUS CONTINUOUS
Status: DISCONTINUED | OUTPATIENT
Start: 2023-07-06 | End: 2023-07-06 | Stop reason: HOSPADM

## 2023-07-06 RX ORDER — FENTANYL CITRATE 50 UG/ML
25-50 INJECTION, SOLUTION INTRAMUSCULAR; INTRAVENOUS EVERY 5 MIN PRN
Status: DISCONTINUED | OUTPATIENT
Start: 2023-07-06 | End: 2023-07-06 | Stop reason: HOSPADM

## 2023-07-06 RX ORDER — DIPHENHYDRAMINE HYDROCHLORIDE 50 MG/ML
25-50 INJECTION INTRAMUSCULAR; INTRAVENOUS
Status: COMPLETED | OUTPATIENT
Start: 2023-07-06 | End: 2023-07-06

## 2023-07-06 RX ADMIN — FENTANYL CITRATE 25 MCG: 50 INJECTION, SOLUTION INTRAMUSCULAR; INTRAVENOUS at 11:06

## 2023-07-06 RX ADMIN — LIDOCAINE HYDROCHLORIDE 10 ML: 10 INJECTION, SOLUTION EPIDURAL; INFILTRATION; INTRACAUDAL; PERINEURAL at 10:59

## 2023-07-06 RX ADMIN — DIPHENHYDRAMINE HYDROCHLORIDE 50 MG: 50 INJECTION, SOLUTION INTRAMUSCULAR; INTRAVENOUS at 10:49

## 2023-07-06 RX ADMIN — FENTANYL CITRATE 50 MCG: 50 INJECTION, SOLUTION INTRAMUSCULAR; INTRAVENOUS at 10:49

## 2023-07-06 RX ADMIN — FENTANYL CITRATE 25 MCG: 50 INJECTION, SOLUTION INTRAMUSCULAR; INTRAVENOUS at 10:58

## 2023-07-06 RX ADMIN — SODIUM CHLORIDE: 9 INJECTION, SOLUTION INTRAVENOUS at 08:06

## 2023-07-06 ASSESSMENT — ACTIVITIES OF DAILY LIVING (ADL)
ADLS_ACUITY_SCORE: 35
ADLS_ACUITY_SCORE: 35

## 2023-07-06 NOTE — IR NOTE
Patient Name: Marisa Vigil  Medical Record Number: [id number]  Today's Date: 7/6/2023    Procedure: CT guided bone biopsy of T7 vertebra  Proceduralist: Dr ELYSSA Ryan, Dr JOANNA Cruz    Sedation start time: 1049  Sedation end time: 1112  Sedation medications administered: 50 mg Benadryl, 100 mcg fentanyl  Total sedation time: 23 min  Sedation Notes: pt very anxious     Procedure start time: 1057  Procedure end time: 1112    Report given to: Sayra SOTO   : none    Other Notes: Pt arrived to IR room CT2 from . Consent reviewed, pt confirmed. Pt denies any questions or concerns regarding procedure. Pt positioned prone and monitored per protocol. Pathology not present for procedure. Specimens sent as ordered. Thoracic spine site cleansed and dressed per protocol. Pt tolerated procedure without any noted complications. Pt transferred back to .

## 2023-07-06 NOTE — PROGRESS NOTES
Discussed with radiology about bone biopsy today. Recommended biopsy of hypointense lesion in the posterior T7 vertebral body on the right which extends to the right T7 pedicle. T8 lesion before was unchanged. Changed order for biopsy of T8 lesion to T7 lesion for patient. Appreciate radiology input.      Michael Conde MD  Internal Medicine  Primary Care Clinic, [place], [place]

## 2023-07-06 NOTE — PROGRESS NOTES
CT-guided biopsy of the T7 vertebral lesion is performed without complication.    There was another indeterminate small lesion at T8 vertebral body. This was not suitable for the biopsy, discussed with the ordering provider Dr. Conde. Biopsy of the T7 lesion was decided.

## 2023-07-06 NOTE — PROGRESS NOTES
Returned from IR s/p vertebral biopsy.  BP elevated.  Upper back biopsy site C/D/I.  Denies pain.  Resting in bed.

## 2023-07-06 NOTE — PROGRESS NOTES
Pt up in loyd to bathroom and tolerated well.  VSS.  Denies pain.  PIV d/c'd.  Discharge instructions went over with patient.  Pt wheeled to front lobby at 1230.

## 2023-07-06 NOTE — PROGRESS NOTES
Interventional Radiology Pre-Procedure Sedation Assessment   Time of Assessment: 9:52 AM    Expected Level: Moderate Sedation    Indication: Sedation is required for the following type of Procedure: Biopsy    Sedation and procedural consent: Risks, benefits and alternatives were discussed with Patient    PO Intake: Appropriately NPO for procedure    ASA Class: Class 1 - HEALTHY PATIENT    Mallampati: Grade 1:  Soft palate, uvula, tonsillar pillars, and posterior pharyngeal wall visible    Lungs: Lungs Clear with good breath sounds bilaterally    Heart: Normal heart sounds and rate    History and physical reviewed and no updates needed. I have reviewed the lab findings, diagnostic data, medications, and the plan for sedation. I have determined this patient to be an appropriate candidate for the planned sedation and procedure and have reassessed the patient IMMEDIATELY PRIOR to sedation and procedure.    Toby Patton MD

## 2023-07-06 NOTE — DISCHARGE INSTRUCTIONS
[facility]    Interventional Radiology  Patient Instructions Following Biopsy    AFTER YOU GO HOME  If you were given sedation DO NOT drive or operate machinery at home or at work for at least 24 hours  DO relax and take it easy for 48 hours, no strenuous activity for 24 hours  DO drink plenty of fluids  DO resume your regular diet, unless otherwise instructed by your Primary Physician  Keep the dressing dry and in place for 24 hours.  DO NOT SMOKE FOR AT LEAST 24 HOURS, if you have been given any medications that were to help you relax or sedate you during your procedure  DO NOT drink alcoholic beverages the day of your procedure  DO NOT do any strenuous exercise or lifting (> 10 lbs) for at least 7 days following your procedure  DO NOT take a bath or shower for at least 12 hours following your procedure  Remove dressing after shower the next day. Replace with Band aid for 2 days.  Never leave a wet dressing in place.  DO NOT make any important or legal decisions for 24 hours following your procedure  There should be minimum drainage from the biopsy site    CALL THE PHYSICIAN IF:  You start bleeding from the procedure site.  If you do start to bleed from that site, lie down flat and hold pressure on the site for a minimum of 10 minutes.  Your physician will tell you if you need to return to the hospital  You develop nausea or vomiting  You have excessive swelling, redness, or tenderness at the site  You have drainage that looks like it is infected.  You experience severe pain  You develop hives or a rash or unexplained itching  You develop shortness of breath  You develop a temperature of 101 degrees F or greater  You develop bloody clots or red urine after you are discharged  You develop chest pain or cough up blood, lightheadedness or fainting    ADDITIONAL INSTRUCTIONS  If you are taking Coumadin, restart tonight.  Follow up with your Coumadin Clinic or Primary Care MD to have your INR  rechecked.    [facility] INTERVENTIONAL RADIOLOGY DEPARTMENT  Procedure Physician:Dr ELYSSA Ryan, Dr JOANNA Cruz                                   Date of procedure: July 6, 2023  Telephone Numbers: [phone]      Monday-Friday 7:30 am to 4:00 pm  [phone] After 4:00 pm Monday-Friday, Weekends & Holidays.   Ask for the Interventional Radiologist on call.  Someone is on call 24 hrs/day  [facility] toll free number: [phone] Monday-Friday 8:00 am to 4:30 pm  [facility] Emergency Dept: [phone]

## 2023-07-07 ENCOUNTER — TELEPHONE (OUTPATIENT)
Dept: INTERVENTIONAL RADIOLOGY/VASCULAR | Facility: CLINIC | Age: 73
End: 2023-07-07
Payer: MEDICARE

## 2023-07-07 ENCOUNTER — MYC MEDICAL ADVICE (OUTPATIENT)
Dept: INTERNAL MEDICINE | Facility: CLINIC | Age: 73
End: 2023-07-07
Payer: MEDICARE

## 2023-07-07 LAB
PATH REPORT.COMMENTS IMP SPEC: NORMAL
PATH REPORT.COMMENTS IMP SPEC: NORMAL
PATH REPORT.FINAL DX SPEC: NORMAL
PATH REPORT.GROSS SPEC: NORMAL
PATH REPORT.MICROSCOPIC SPEC OTHER STN: NORMAL
PATH REPORT.RELEVANT HX SPEC: NORMAL
PHOTO IMAGE: NORMAL

## 2023-07-07 NOTE — TELEPHONE ENCOUNTER
POST CALL    Spoke with: Marisa Nelson  Call attempt: 1  Any pain: Yes  Any fever: No  Any redness/swelling/ abnormal drainage around puncture site: No  Were you instructed well enough to take care of yourself at home: No  Are you satisfied with the care you received: No  Any additional concerns or questions: Yes, patient expressed many concerns.  Experiencing back pain at biopsy site as well as other areas (neck and lower back) related to previous issues. Patient voiced is not taking any OTC medications or methods for pain control. Caller advised patient to take tylenol and utilize cold and/or heat packs to assist with pain control. Patient additionally concerned for location of biopsy versus what she was told.  Caller sent message to Dr. Conde to reach out to patient to address concerns.  IR nurse triage line number provided: Yes    Post call completed.   July 7, 2023 9:18 AM  Debbie Schmid RN

## 2023-07-17 NOTE — TELEPHONE ENCOUNTER
Able to call pathology about pathology report. Biopsied lesion should be T7 lesion and not T8.       Michael Conde MD  Internal Medicine  Primary Care Clinic, [place], [place]

## 2023-07-18 NOTE — TELEPHONE ENCOUNTER
This will stay even with CT guided bone biopsy procedure noting biopsy at T7 via a right pedicular approach?      Michael Conde MD  Internal Medicine  [facility] Clinic, [place], [place]

## 2023-07-21 NOTE — TELEPHONE ENCOUNTER
Unfortunate but will mention on future charting of correct lesion biopsied.     Michael Conde MD  Internal Medicine  [facility] Clinic, [place], [place]

## 2023-07-21 NOTE — TELEPHONE ENCOUNTER
RN called and spoke with surgical pathology ([phone]), only labeled as Thoracic Spine Pathology, only thing that is noted regarding T8 is clinical history states T8, that is not something pathology has access to change. Nothing pathology could or would change. If they had indicated thoracic spine T8, they could change note, but they didn't even specify the T8 versus T7. Unaware if clinical hx in exam could be changed, but that is something that they cannot change and whoever entered the clinical hx section would have to change it.     Routed to Dr. Conde to update.     ELDON LEÓN RN on 7/21/2023 at 11:01 AM

## 2023-07-21 NOTE — TELEPHONE ENCOUNTER
I don't know if a question to change labeling with pathology was asked based on CT procedure note, noting that on CT the biopsy was for the T7 lesion and not the T8 lesion. Our first response was that no change on biopsy result as order for pathology noted T8 at time of biopsy.       Michael Conde MD  Internal Medicine  [facility] Clinic, [place], [place]

## 2023-07-24 ENCOUNTER — LAB (OUTPATIENT)
Dept: LAB | Facility: CLINIC | Age: 73
End: 2023-07-24
Payer: MEDICARE

## 2023-07-24 ENCOUNTER — OFFICE VISIT (OUTPATIENT)
Dept: INTERNAL MEDICINE | Facility: CLINIC | Age: 73
End: 2023-07-24
Payer: MEDICARE

## 2023-07-24 ENCOUNTER — ANCILLARY PROCEDURE (OUTPATIENT)
Dept: CT IMAGING | Facility: CLINIC | Age: 73
End: 2023-07-24
Attending: INTERNAL MEDICINE
Payer: MEDICARE

## 2023-07-24 VITALS
DIASTOLIC BLOOD PRESSURE: 87 MMHG | SYSTOLIC BLOOD PRESSURE: 151 MMHG | BODY MASS INDEX: 23.13 KG/M2 | HEIGHT: 61 IN | OXYGEN SATURATION: 97 % | TEMPERATURE: 97.7 F | HEART RATE: 72 BPM | WEIGHT: 122.5 LBS

## 2023-07-24 DIAGNOSIS — R10.31 RLQ ABDOMINAL PAIN: ICD-10-CM

## 2023-07-24 DIAGNOSIS — R10.11 RUQ ABDOMINAL PAIN: Primary | ICD-10-CM

## 2023-07-24 DIAGNOSIS — R10.11 RUQ ABDOMINAL PAIN: ICD-10-CM

## 2023-07-24 LAB
ALBUMIN SERPL BCG-MCNC: 4.7 G/DL (ref 3.5–5.2)
ALBUMIN UR-MCNC: NEGATIVE MG/DL
ALP SERPL-CCNC: 46 U/L (ref 35–104)
ALT SERPL W P-5'-P-CCNC: 56 U/L (ref 0–50)
ANION GAP SERPL CALCULATED.3IONS-SCNC: 10 MMOL/L (ref 7–15)
APPEARANCE UR: CLEAR
AST SERPL W P-5'-P-CCNC: 55 U/L (ref 0–45)
BILIRUB SERPL-MCNC: 1 MG/DL
BILIRUB UR QL STRIP: NEGATIVE
BUN SERPL-MCNC: 11.8 MG/DL (ref 8–23)
CALCIUM SERPL-MCNC: 9.7 MG/DL (ref 8.8–10.2)
CHLORIDE SERPL-SCNC: 100 MMOL/L (ref 98–107)
COLOR UR AUTO: NORMAL
CREAT BLD-MCNC: 0.7 MG/DL (ref 0.5–1)
CREAT SERPL-MCNC: 0.79 MG/DL (ref 0.51–0.95)
CRP SERPL-MCNC: <3 MG/L
DEPRECATED HCO3 PLAS-SCNC: 26 MMOL/L (ref 22–29)
ERYTHROCYTE [DISTWIDTH] IN BLOOD BY AUTOMATED COUNT: 13.3 % (ref 10–15)
GFR SERPL CREATININE-BSD FRML MDRD: 79 ML/MIN/1.73M2
GFR SERPL CREATININE-BSD FRML MDRD: >60 ML/MIN/1.73M2
GLUCOSE SERPL-MCNC: 100 MG/DL (ref 70–99)
GLUCOSE UR STRIP-MCNC: NEGATIVE MG/DL
HCT VFR BLD AUTO: 43 % (ref 35–47)
HGB BLD-MCNC: 13.8 G/DL (ref 11.7–15.7)
HGB UR QL STRIP: NEGATIVE
KETONES UR STRIP-MCNC: NEGATIVE MG/DL
LEUKOCYTE ESTERASE UR QL STRIP: NEGATIVE
LIPASE SERPL-CCNC: 20 U/L (ref 13–60)
MCH RBC QN AUTO: 29.2 PG (ref 26.5–33)
MCHC RBC AUTO-ENTMCNC: 32.1 G/DL (ref 31.5–36.5)
MCV RBC AUTO: 91 FL (ref 78–100)
NITRATE UR QL: NEGATIVE
PH UR STRIP: 6.5 [PH] (ref 5–7)
PLATELET # BLD AUTO: 260 10E3/UL (ref 150–450)
POTASSIUM SERPL-SCNC: 4.4 MMOL/L (ref 3.4–5.3)
PROT SERPL-MCNC: 7 G/DL (ref 6.4–8.3)
RBC # BLD AUTO: 4.73 10E6/UL (ref 3.8–5.2)
SODIUM SERPL-SCNC: 136 MMOL/L (ref 136–145)
SP GR UR STRIP: 1 (ref 1–1.03)
UROBILINOGEN UR STRIP-MCNC: NORMAL MG/DL
WBC # BLD AUTO: 8.2 10E3/UL (ref 4–11)

## 2023-07-24 PROCEDURE — 86140 C-REACTIVE PROTEIN: CPT | Performed by: PATHOLOGY

## 2023-07-24 PROCEDURE — 82565 ASSAY OF CREATININE: CPT | Performed by: PATHOLOGY

## 2023-07-24 PROCEDURE — 80053 COMPREHEN METABOLIC PANEL: CPT | Performed by: PATHOLOGY

## 2023-07-24 PROCEDURE — 99215 OFFICE O/P EST HI 40 MIN: CPT | Performed by: INTERNAL MEDICINE

## 2023-07-24 PROCEDURE — 36415 COLL VENOUS BLD VENIPUNCTURE: CPT | Performed by: PATHOLOGY

## 2023-07-24 PROCEDURE — 83690 ASSAY OF LIPASE: CPT | Performed by: PATHOLOGY

## 2023-07-24 PROCEDURE — 81003 URINALYSIS AUTO W/O SCOPE: CPT | Performed by: PATHOLOGY

## 2023-07-24 PROCEDURE — 74177 CT ABD & PELVIS W/CONTRAST: CPT | Mod: MA | Performed by: STUDENT IN AN ORGANIZED HEALTH CARE EDUCATION/TRAINING PROGRAM

## 2023-07-24 PROCEDURE — 85027 COMPLETE CBC AUTOMATED: CPT | Performed by: PATHOLOGY

## 2023-07-24 RX ORDER — IOPAMIDOL 755 MG/ML
71 INJECTION, SOLUTION INTRAVASCULAR ONCE
Status: COMPLETED | OUTPATIENT
Start: 2023-07-24 | End: 2023-07-24

## 2023-07-24 RX ADMIN — IOPAMIDOL 71 ML: 755 INJECTION, SOLUTION INTRAVASCULAR at 13:24

## 2023-07-24 NOTE — PROGRESS NOTES
"S) Ms. Vigil is a 72 yo woman with a h/o TIA, past kidney stones requiring lithotripsy and recent bone biopsy of a T7 lesion (benign findings) seen for abdominal pain after a cruise. No diarrhea or hematuria. Pain began last week as mild RUQ area discomfort which she attributed to new rich foods and more EtOH than usual (usual is zero and she had 2 glasses of wine with dinners on the cruise). Now has moved to RLQ and persisted over McBurney's point on my exam. Worse lying flat at night. No fevers/chills. Anorexia noted.     O) BP (!) 151/87 (BP Location: Left arm, Patient Position: Sitting, Cuff Size: Adult Regular)   Pulse 72   Ht 1.54 m (5' 0.63\")   Wt 55.6 kg (122 lb 8 oz)   SpO2 97%   BMI 23.43 kg/m    Temp is 97.7 deg F  Nl mentation and no distress  Abd soft with audible BS- tenderness elicited on palpation in RLQ between anterior superior iliac crest and umbilicus. Tenderness there with percussion elsewhere. Mild rebound. No guarding. RUQ exam without Chong's sign.   Cor RRR no MRG  Lungs clear  Ext without edema   Skin no rashes    A/P) Abdominal pain. Acute findings over McBurney's point concerning for appendiceal pathology. Initial RUQ pain with fatty rich diet also concerning for cholecystitis or choledocolithiasis. Finally, differential also includes right nephrolithiasis given migration of pain and past stones. Stat abd CT indicated for triage and definitive diagnosis. Labs ordered to assess LFTs, CBC, CRP, lipase.    40 minutes spent on the date of the encounter performing chart review, history and exam, documentation and further activities as noted above.    Rene Griffin MD  Primary Care Center  [facility]     ADDENDUM: CT reviewed and no acute findings to explain symptoms. LFT's mildly elevated which would fit with a resolving episode of choledocolithiasis/cholecystitis. CRP<3 and lipase normal so comfortable with conservative dietary changes (BRAT diet) and clinical follow-up.  "

## 2023-07-24 NOTE — DISCHARGE INSTRUCTIONS

## 2023-07-24 NOTE — NURSING NOTE
"Marisa Vigil is a 73 year old female patient that presents today in clinic for the following:    Chief Complaint   Patient presents with    Abdominal Pain     Abdominal pain since 15 July. Pain rated 7/10.     The patient's allergies and medications were reviewed as noted. A set of vitals were recorded as noted without incident: BP (!) 151/87 (BP Location: Left arm, Patient Position: Sitting, Cuff Size: Adult Regular)   Pulse 72   Ht 1.54 m (5' 0.63\")   Wt 55.6 kg (122 lb 8 oz)   SpO2 97%   BMI 23.43 kg/m  . The patient does not have any other questions for the provider.    Steven Hdz, EMT at 12:04 PM on 7/24/2023.  Primary care clinic: [phone]    "

## 2023-07-26 ENCOUNTER — OFFICE VISIT (OUTPATIENT)
Dept: ORTHOPEDICS | Facility: CLINIC | Age: 73
End: 2023-07-26
Payer: MEDICARE

## 2023-07-26 VITALS — BODY MASS INDEX: 23.03 KG/M2 | HEIGHT: 61 IN | WEIGHT: 122 LBS

## 2023-07-26 DIAGNOSIS — S76.011D MUSCLE STRAIN OF RIGHT GLUTEAL REGION, SUBSEQUENT ENCOUNTER: Primary | ICD-10-CM

## 2023-07-26 DIAGNOSIS — S76.011A TEAR OF GLUTEUS MINIMUS TENDON, RIGHT, INITIAL ENCOUNTER: ICD-10-CM

## 2023-07-26 PROCEDURE — 99214 OFFICE O/P EST MOD 30 MIN: CPT | Mod: GC | Performed by: FAMILY MEDICINE

## 2023-07-26 NOTE — PROGRESS NOTES
Follow Up Right Hip: patient reports on 7/6/2023 she under went a biopsy for her thoracic spine. She notes since that time, her symptoms have returned in her right hip. Patient expresses frustration as she was having such positive results and felt she was getting back to her normal daily activity.   Intensity: 4-5/10  Aggravating factors: sitting for an extended period of time and walking for an extended period of time.  Relieving Factors: Right greater trochanteric bursa injection administered on 6/23/2023 did provide significant relief to her symptoms (2 weeks) until her biopsy.

## 2023-07-26 NOTE — PROGRESS NOTES
[facility]  Sports Medicine Clinic  Clinics and Surgery Center              SUBJECTIVE       Marisa iVgil is a 73 year old female with history of piriformis syndrome presenting to clinic today for right buttock pain.    Patient underwent US guided CSI injection of the R troch on 6/23/23 with excellent relief with 0/10 pain until she had an acute exacerbation after her thoracic bone biopsy on 7/6/23. She reports that at that time she then had re-exacerbation of what felt like her same symptoms. She then was on holiday in the [facility] last week and walking on uneven ground which did not help. She reports that pain is present with sitting and walking. Occasional twinges of pain that radiate to troc, but otherwise mostly along glutes. Patient also feeling weak due to pain. NO numbness or tingling currently. Has been icing, heat, PRN ibu for pain with some relief. Currently feeling about 30% better since this most recent flare.    PMH, Medications and Allergies were reviewed and updated as needed.    ROS:  As noted above otherwise negative.    Patient Active Problem List   Diagnosis     Pulmonary nodules     Travel advice encounter     Thyroid nodule     Hypothyroidism, unspecified type     Age-related osteoporosis without current pathological fracture     TMJ (temporomandibular joint syndrome)     Gastroesophageal reflux disease without esophagitis     Breast pain, left     Elevated blood pressure reading without diagnosis of hypertension     Right sided abdominal pain     RLQ abdominal pain     Back muscle spasm     Traveler's diarrhea     Skin lesion     Cataracts, both eyes     Chest pain     Dry eyes     Dyspareunia     Dysuria     History of abnormal cervical Papanicolaou smear     History of renal calculi     Leukorrhea     Malignant melanoma of skin of chest (H)     Menopausal syndrome     Non-toxic multinodular goiter     Osteoporosis     Other diseases of nasal cavity and sinuses(478.19)     Pain  "in female pelvis     Abscess of tonsil     Senile osteopenia     Sensorineural hearing loss (SNHL) of both ears     Swelling of breast     Urinary tract infectious disease     Uterine leiomyoma     Viral hepatitis A     AUDREY positive     TIA (transient ischemic attack)     Hyperlipidemia, unspecified hyperlipidemia type     Neck pain     Chronic midline low back pain with right-sided sciatica     Bilateral impacted cerumen       Current Outpatient Medications   Medication Sig Dispense Refill     aspirin 81 MG EC tablet Take 1 tablet (81 mg) by mouth daily       atorvastatin (LIPITOR) 20 MG tablet Take 1 tablet (20 mg) by mouth daily 90 tablet 1     calcium citrate and vitamin D (CITRACAL) 200-250 MG-UNIT TABS per tablet Take 1 tablet by mouth 2 times daily       diclofenac (VOLTAREN) 50 MG EC tablet Take 1 tablet (50 mg) by mouth 2 times daily 28 tablet 1     erythromycin (ROMYCIN) 5 MG/GM ophthalmic ointment Place 0.5 inches into the right eye At Bedtime 7 g 1     levothyroxine (SYNTHROID/LEVOTHROID) 88 MCG tablet Take 1 tablet (88 mcg) by mouth daily Every day but Sunday 90 tablet 3     multivitamin w/minerals (THERA-VIT-M) tablet Take 1 tablet by mouth daily              OBJECTIVE:       Vitals:   Vitals:    07/26/23 1421   Weight: 55.3 kg (122 lb)   Height: 1.54 m (5' 0.63\")     BMI: Body mass index is 23.33 kg/m .    Gen:  Well nourished and in no acute distress  HEENT: Extraocular movement intact  Neck: Supple  Pulm:  Breathing Comfortably. No increased respiratory effort.  Psych: Euthymic. Appropriately answers questions    MSK:   Musculoskeletal - hip  - stance: gait slightly favors affected side, no obvious leg length discrepancy, heel and toe walk with pain in R glute  - inspection: no swelling or effusion,  normal bone and joint alignment, no obvious deformity  - palpation: no tenderness at the ASIS, anterior hip flexors   Minimal TTP at greater trochanter, gluteal muscles, ITB, hamstring/ischial " tuberosity, SI joint   TTP of the glutes/ piriformis/ external rotators near the sacrum   - ROM: Full flexion, extension, internal, external rotation, abduction, adduction in passive without pain, active with pain  - strength: 3+/5 with pain with abduction, 4/5 with pain with hip flexion and extension  - special tests: negative Trendelenberg, JAMILA, FADIR, Stichfield, Johana, Scour positive for pain in the lumbar region  Neuro  - no sensory or motor deficit, grossly normal coordination, normal muscle tone  Skin  - no ecchymosis, erythema, warmth, or induration, no obvious rash      MRI hip - 6/10/23     FINDINGS:  There is artifact which deteriorates image quality along the inferior aspect of the field-of-view.     RIGHT HIP:   -Labrum: Short segment degenerative tear along the anterosuperior labrum involving the chondral labral junction. No paralabral cyst.   -Cartilage: Localized partial-thickness cartilage loss and fissuring along the anterosuperior acetabular rim near the chondral labral junction. Otherwise, there is no focal cartilage defects. No subchondral bone marrow edema.  -Joint space: Small amount of joint fluid. No discrete loose bodies.   -Joint capsule/ligaments: Intact joint capsule. Ligamentum teres is diminutive but intact.     MUSCLES AND TENDONS:   -Gluteal: There is advanced tendinopathy of the right gluteus minimus tendon with a high-grade tear at the trochanteric insertion with adjacent greater trochanteric bursitis. Advanced gluteus medius tendinopathy without a discrete tear.  -Proximal hamstring: No high-grade tear. No edema about the ischial tuberosity.  -Iliopsoas: No tendon tear or tendinopathy. No bursitis.  -Rectus femoris origin: No tear or tendinopathy.      BONES:   -There is no evidence of a fracture or bony stress reaction about the right hip.   -Visualized right SI joint is maintained.     SOFT TISSUES:   -Normal muscle bulk. No acute muscular injury.     INTRA-PELVIC  CONTENTS:  -Visualized portions are normal.                                                                    IMPRESSION:  1.  Right greater trochanteric bursitis with a high-grade tear of the right gluteus minimus tendon. Advanced tendinopathy of both gluteus minimus and medius.   2.  Mild degenerative changes of the right hip with a short segment anterosuperior labral tear.            ASSESSMENT and PLAN:     Marisa was seen today for recheck.    Diagnoses and all orders for this visit:    Muscle strain of right gluteal region, subsequent encounter    Tear of gluteus minimus tendon, right, initial encounter  -     diclofenac (VOLTAREN) 50 MG EC tablet; Take 1 tablet (50 mg) by mouth 2 times daily      Marisa Vigil is a 73 year old female with history of piriformis syndrome presenting to clinic today for aggravation of her gluteus tendinopathy. Patient was doing well after greater troch CSI 1 month ago, but pain was reactivated after positioning for thoracic spine bone biopsy (negative for malignancy). Currently improving with non-operative treatment. It is too soon for a repeat CSI, so will treat as below:     - Trial of diclofenac (voltaren) twice daily every day for 2 weeks  - can take PTA methocarbamol or tylenol for additional pain relief  - continue with PT  - if not improved within 2 months, return to care to consider repeat US guided troch CSI  Return sooner if develops new or worsening symptoms.    Options for treatment and/or follow-up care were reviewed with the patient was actively involved in the decision making process. Patient verbalized understanding and was in agreement with the plan.    The patient was seen by and discussed with attending physician Dr.Suzanne DIDI Chavez MD, CAQ, CCD, who agrees with the plan as stated unless otherwise stated.     Pietro Dorantes MD   Primary Care Sports Medicine Fellow

## 2023-07-26 NOTE — LETTER
7/26/2023      RE: Marisa Vigil  [address]   [address]  [place] [address]     Dear Colleague,    Thank you for referring your patient, Marisa Vigil, to the [facility] [place]. Please see a copy of my visit note below.    Follow Up Right Hip: patient reports on 7/6/2023 she under went a biopsy for her thoracic spine. She notes since that time, her symptoms have returned in her right hip. Patient expresses frustration as she was having such positive results and felt she was getting back to her normal daily activity.   Intensity: 4-5/10  Aggravating factors: sitting for an extended period of time and walking for an extended period of time.  Relieving Factors: Right greater trochanteric bursa injection administered on 6/23/2023 did provide significant relief to her symptoms (2 weeks) until her biopsy.        [facility]  Sports Medicine Clinic  Clinics and Surgery Center              SUBJECTIVE       Marisa Vigil is a 73 year old female with history of piriformis syndrome presenting to clinic today for right buttock pain.    Patient underwent US guided CSI injection of the R troch on 6/23/23 with excellent relief with 0/10 pain until she had an acute exacerbation after her thoracic bone biopsy on 7/6/23. She reports that at that time she then had re-exacerbation of what felt like her same symptoms. She then was on holiday in the [facility] last week and walking on uneven ground which did not help. She reports that pain is present with sitting and walking. Occasional twinges of pain that radiate to troc, but otherwise mostly along glutes. Patient also feeling weak due to pain. NO numbness or tingling currently. Has been icing, heat, PRN ibu for pain with some relief. Currently feeling about 30% better since this most recent flare.    PMH, Medications and Allergies were reviewed and updated as needed.    ROS:  As noted above otherwise negative.    Patient  Active Problem List   Diagnosis    Pulmonary nodules    Travel advice encounter    Thyroid nodule    Hypothyroidism, unspecified type    Age-related osteoporosis without current pathological fracture    TMJ (temporomandibular joint syndrome)    Gastroesophageal reflux disease without esophagitis    Breast pain, left    Elevated blood pressure reading without diagnosis of hypertension    Right sided abdominal pain    RLQ abdominal pain    Back muscle spasm    Traveler's diarrhea    Skin lesion    Cataracts, both eyes    Chest pain    Dry eyes    Dyspareunia    Dysuria    History of abnormal cervical Papanicolaou smear    History of renal calculi    Leukorrhea    Malignant melanoma of skin of chest (H)    Menopausal syndrome    Non-toxic multinodular goiter    Osteoporosis    Other diseases of nasal cavity and sinuses(478.19)    Pain in female pelvis    Abscess of tonsil    Senile osteopenia    Sensorineural hearing loss (SNHL) of both ears    Swelling of breast    Urinary tract infectious disease    Uterine leiomyoma    Viral hepatitis A    AUDREY positive    TIA (transient ischemic attack)    Hyperlipidemia, unspecified hyperlipidemia type    Neck pain    Chronic midline low back pain with right-sided sciatica    Bilateral impacted cerumen       Current Outpatient Medications   Medication Sig Dispense Refill    aspirin 81 MG EC tablet Take 1 tablet (81 mg) by mouth daily      atorvastatin (LIPITOR) 20 MG tablet Take 1 tablet (20 mg) by mouth daily 90 tablet 1    calcium citrate and vitamin D (CITRACAL) 200-250 MG-UNIT TABS per tablet Take 1 tablet by mouth 2 times daily      diclofenac (VOLTAREN) 50 MG EC tablet Take 1 tablet (50 mg) by mouth 2 times daily 28 tablet 1    erythromycin (ROMYCIN) 5 MG/GM ophthalmic ointment Place 0.5 inches into the right eye At Bedtime 7 g 1    levothyroxine (SYNTHROID/LEVOTHROID) 88 MCG tablet Take 1 tablet (88 mcg) by mouth daily Every day but Sunday 90 tablet 3    multivitamin  "w/minerals (THERA-VIT-M) tablet Take 1 tablet by mouth daily              OBJECTIVE:       Vitals:   Vitals:    07/26/23 1421   Weight: 55.3 kg (122 lb)   Height: 1.54 m (5' 0.63\")     BMI: Body mass index is 23.33 kg/m .    Gen:  Well nourished and in no acute distress  HEENT: Extraocular movement intact  Neck: Supple  Pulm:  Breathing Comfortably. No increased respiratory effort.  Psych: Euthymic. Appropriately answers questions    MSK:   Musculoskeletal - hip  - stance: gait slightly favors affected side, no obvious leg length discrepancy, heel and toe walk with pain in R glute  - inspection: no swelling or effusion,  normal bone and joint alignment, no obvious deformity  - palpation: no tenderness at the ASIS, anterior hip flexors   Minimal TTP at greater trochanter, gluteal muscles, ITB, hamstring/ischial tuberosity, SI joint   TTP of the glutes/ piriformis/ external rotators near the sacrum   - ROM: Full flexion, extension, internal, external rotation, abduction, adduction in passive without pain, active with pain  - strength: 3+/5 with pain with abduction, 4/5 with pain with hip flexion and extension  - special tests: negative Trendelenberg, JAMILA, FADIR, Stichfield, Johana, Scour positive for pain in the lumbar region  Neuro  - no sensory or motor deficit, grossly normal coordination, normal muscle tone  Skin  - no ecchymosis, erythema, warmth, or induration, no obvious rash      MRI hip - 6/10/23     FINDINGS:  There is artifact which deteriorates image quality along the inferior aspect of the field-of-view.     RIGHT HIP:   -Labrum: Short segment degenerative tear along the anterosuperior labrum involving the chondral labral junction. No paralabral cyst.   -Cartilage: Localized partial-thickness cartilage loss and fissuring along the anterosuperior acetabular rim near the chondral labral junction. Otherwise, there is no focal cartilage defects. No subchondral bone marrow edema.  -Joint space: Small amount of " joint fluid. No discrete loose bodies.   -Joint capsule/ligaments: Intact joint capsule. Ligamentum teres is diminutive but intact.     MUSCLES AND TENDONS:   -Gluteal: There is advanced tendinopathy of the right gluteus minimus tendon with a high-grade tear at the trochanteric insertion with adjacent greater trochanteric bursitis. Advanced gluteus medius tendinopathy without a discrete tear.  -Proximal hamstring: No high-grade tear. No edema about the ischial tuberosity.  -Iliopsoas: No tendon tear or tendinopathy. No bursitis.  -Rectus femoris origin: No tear or tendinopathy.      BONES:   -There is no evidence of a fracture or bony stress reaction about the right hip.   -Visualized right SI joint is maintained.     SOFT TISSUES:   -Normal muscle bulk. No acute muscular injury.     INTRA-PELVIC CONTENTS:  -Visualized portions are normal.                                                                    IMPRESSION:  1.  Right greater trochanteric bursitis with a high-grade tear of the right gluteus minimus tendon. Advanced tendinopathy of both gluteus minimus and medius.   2.  Mild degenerative changes of the right hip with a short segment anterosuperior labral tear.            ASSESSMENT and PLAN:     Marisa was seen today for recheck.    Diagnoses and all orders for this visit:    Muscle strain of right gluteal region, subsequent encounter    Tear of gluteus minimus tendon, right, initial encounter  -     diclofenac (VOLTAREN) 50 MG EC tablet; Take 1 tablet (50 mg) by mouth 2 times daily      Marisa Vigil is a 73 year old female with history of piriformis syndrome presenting to clinic today for aggravation of her gluteus tendinopathy. Patient was doing well after greater troch CSI 1 month ago, but pain was reactivated after positioning for thoracic spine bone biopsy (negative for malignancy). Currently improving with non-operative treatment. It is too soon for a repeat CSI, so will treat as below:     - Trial  of diclofenac (voltaren) twice daily every day for 2 weeks  - can take PTA methocarbamol or tylenol for additional pain relief  - continue with PT  - if not improved within 2 months, return to care to consider repeat US guided troch CSI  Return sooner if develops new or worsening symptoms.    Options for treatment and/or follow-up care were reviewed with the patient was actively involved in the decision making process. Patient verbalized understanding and was in agreement with the plan.    The patient was seen by and discussed with attending physician Dr.Suzanne DIDI Chavez MD, CAQ, CCD, who agrees with the plan as stated unless otherwise stated.     Pietro Dorantes MD   Primary Care Sports Medicine Fellow      Attending Note:   I have personally examined this patient and have reviewed the clinical presentation and progress note with the fellow. I agree with the treatment plan as outlined. The plan was formulated with the fellow on the day of the patient's visit.  Denise Chavez MD, CAQ, CCD  [facility]  Sports Medicine and Bone Health      Again, thank you for allowing me to participate in the care of your patient.      Sincerely,    Denise Chavez MD

## 2023-07-26 NOTE — PATIENT INSTRUCTIONS
Start diclofenac (voltaren) twice daily every day for 2 weeks.  While you are on this medicine, go back to the full 325mg aspirin  If you cannot get to sleep due to pain, you can take the methocarbamol or tylenol as prescribed by your other doctor.  Try foam rolling the glutes daily at tolerated.  Once you are feeling up to it, continue with PT

## 2023-07-31 NOTE — PROGRESS NOTES
Attending Note:   I have personally examined this patient and have reviewed the clinical presentation and progress note with the fellow. I agree with the treatment plan as outlined. The plan was formulated with the fellow on the day of the patient's visit.  Denise Chavez MD, CAQ, CCD  [facility]  Sports Medicine and Bone Health

## 2023-08-14 ENCOUNTER — TRANSFERRED RECORDS (OUTPATIENT)
Dept: HEALTH INFORMATION MANAGEMENT | Facility: CLINIC | Age: 73
End: 2023-08-14
Payer: MEDICARE

## 2023-08-17 ENCOUNTER — LAB (OUTPATIENT)
Dept: LAB | Facility: CLINIC | Age: 73
End: 2023-08-17
Payer: MEDICARE

## 2023-08-17 ENCOUNTER — OFFICE VISIT (OUTPATIENT)
Dept: INTERNAL MEDICINE | Facility: CLINIC | Age: 73
End: 2023-08-17
Payer: MEDICARE

## 2023-08-17 VITALS
DIASTOLIC BLOOD PRESSURE: 90 MMHG | SYSTOLIC BLOOD PRESSURE: 142 MMHG | HEART RATE: 85 BPM | BODY MASS INDEX: 22.56 KG/M2 | HEIGHT: 61 IN | WEIGHT: 119.5 LBS | OXYGEN SATURATION: 98 %

## 2023-08-17 DIAGNOSIS — M54.50 ACUTE RIGHT-SIDED LOW BACK PAIN WITHOUT SCIATICA: ICD-10-CM

## 2023-08-17 DIAGNOSIS — Z12.31 ENCOUNTER FOR SCREENING MAMMOGRAM FOR BREAST CANCER: ICD-10-CM

## 2023-08-17 DIAGNOSIS — R10.11 RUQ ABDOMINAL PAIN: ICD-10-CM

## 2023-08-17 DIAGNOSIS — K59.00 CONSTIPATION, UNSPECIFIED CONSTIPATION TYPE: ICD-10-CM

## 2023-08-17 DIAGNOSIS — I10 PRIMARY HYPERTENSION: ICD-10-CM

## 2023-08-17 DIAGNOSIS — M89.9 BONE LESION: ICD-10-CM

## 2023-08-17 DIAGNOSIS — M89.9 BONE LESION: Primary | ICD-10-CM

## 2023-08-17 LAB
ALBUMIN SERPL BCG-MCNC: 5 G/DL (ref 3.5–5.2)
ALP SERPL-CCNC: 57 U/L (ref 35–104)
ALT SERPL W P-5'-P-CCNC: 47 U/L (ref 0–50)
ANION GAP SERPL CALCULATED.3IONS-SCNC: 9 MMOL/L (ref 7–15)
AST SERPL W P-5'-P-CCNC: 42 U/L (ref 0–45)
BASOPHILS # BLD AUTO: 0.1 10E3/UL (ref 0–0.2)
BASOPHILS NFR BLD AUTO: 1 %
BILIRUB SERPL-MCNC: 1.1 MG/DL
BUN SERPL-MCNC: 10.2 MG/DL (ref 8–23)
CALCIUM SERPL-MCNC: 10.1 MG/DL (ref 8.8–10.2)
CHLORIDE SERPL-SCNC: 100 MMOL/L (ref 98–107)
CK SERPL-CCNC: 61 U/L (ref 26–192)
CREAT SERPL-MCNC: 0.84 MG/DL (ref 0.51–0.95)
DEPRECATED HCO3 PLAS-SCNC: 26 MMOL/L (ref 22–29)
EOSINOPHIL # BLD AUTO: 0.1 10E3/UL (ref 0–0.7)
EOSINOPHIL NFR BLD AUTO: 1 %
ERYTHROCYTE [DISTWIDTH] IN BLOOD BY AUTOMATED COUNT: 12.9 % (ref 10–15)
GFR SERPL CREATININE-BSD FRML MDRD: 73 ML/MIN/1.73M2
GLUCOSE SERPL-MCNC: 99 MG/DL (ref 70–99)
HCT VFR BLD AUTO: 43.3 % (ref 35–47)
HGB BLD-MCNC: 14.3 G/DL (ref 11.7–15.7)
IMM GRANULOCYTES # BLD: 0 10E3/UL
IMM GRANULOCYTES NFR BLD: 0 %
LYMPHOCYTES # BLD AUTO: 2.3 10E3/UL (ref 0.8–5.3)
LYMPHOCYTES NFR BLD AUTO: 32 %
MCH RBC QN AUTO: 29.3 PG (ref 26.5–33)
MCHC RBC AUTO-ENTMCNC: 33 G/DL (ref 31.5–36.5)
MCV RBC AUTO: 89 FL (ref 78–100)
MONOCYTES # BLD AUTO: 0.6 10E3/UL (ref 0–1.3)
MONOCYTES NFR BLD AUTO: 8 %
NEUTROPHILS # BLD AUTO: 4.2 10E3/UL (ref 1.6–8.3)
NEUTROPHILS NFR BLD AUTO: 58 %
NRBC # BLD AUTO: 0 10E3/UL
NRBC BLD AUTO-RTO: 0 /100
PLATELET # BLD AUTO: 245 10E3/UL (ref 150–450)
POTASSIUM SERPL-SCNC: 4.3 MMOL/L (ref 3.4–5.3)
PROT SERPL-MCNC: 7.3 G/DL (ref 6.4–8.3)
RBC # BLD AUTO: 4.88 10E6/UL (ref 3.8–5.2)
SODIUM SERPL-SCNC: 135 MMOL/L (ref 136–145)
WBC # BLD AUTO: 7.2 10E3/UL (ref 4–11)

## 2023-08-17 PROCEDURE — 85025 COMPLETE CBC W/AUTO DIFF WBC: CPT | Performed by: PATHOLOGY

## 2023-08-17 PROCEDURE — 36415 COLL VENOUS BLD VENIPUNCTURE: CPT | Performed by: PATHOLOGY

## 2023-08-17 PROCEDURE — 99214 OFFICE O/P EST MOD 30 MIN: CPT | Performed by: HOSPITALIST

## 2023-08-17 PROCEDURE — 82550 ASSAY OF CK (CPK): CPT | Performed by: PATHOLOGY

## 2023-08-17 PROCEDURE — 80053 COMPREHEN METABOLIC PANEL: CPT | Performed by: PATHOLOGY

## 2023-08-17 RX ORDER — AMLODIPINE BESYLATE 2.5 MG/1
2.5 TABLET ORAL DAILY
Qty: 90 TABLET | Refills: 3 | Status: SHIPPED | OUTPATIENT
Start: 2023-08-17 | End: 2023-08-28 | Stop reason: SINTOL

## 2023-08-17 RX ORDER — POLYETHYLENE GLYCOL 3350 17 G/17G
1 POWDER, FOR SOLUTION ORAL DAILY PRN
Qty: 30 PACKET | Refills: 1 | Status: SHIPPED | OUTPATIENT
Start: 2023-08-17

## 2023-08-17 ASSESSMENT — ENCOUNTER SYMPTOMS
ABDOMINAL PAIN: 1
FEVER: 0
CHILLS: 0
DYSURIA: 0
FREQUENCY: 0
NERVOUS/ANXIOUS: 1
CONSTIPATION: 1
SHORTNESS OF BREATH: 0
BACK PAIN: 1

## 2023-08-17 NOTE — PATIENT INSTRUCTIONS
- Recheck MRI thoracic spine with and without contrast in November.   - Check Ultrasound of right upper abdomen.   - Labs for CBC with diff, CMP, CK and stool for Ova and parasite.   - Start on Amlodipine 2.5mg daily for now.   - Start Miralax daily for 7 days, then as needed after.       Follow up again in 3 months
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
## 2023-08-17 NOTE — NURSING NOTE
"Marisa Vigil is a 73 year old female patient that presents today in clinic for the following:    Chief Complaint   Patient presents with    RECHECK     Follow up.  Abdominal pain since started taking statin. Stopped taking statin 4 days ago. Abdomen getting better.  No more melanoma.  No more hip issue.     The patient's allergies and medications were reviewed as noted. A set of vitals were recorded as noted without incident: BP (!) 142/90 (BP Location: Right arm, Patient Position: Sitting, Cuff Size: Adult Regular)   Pulse 85   Ht 1.549 m (5' 1\")   Wt 54.2 kg (119 lb 8 oz)   SpO2 98%   BMI 22.58 kg/m  . The patient does not have any other questions for the provider.    Steven Hdz, EMT at 3:13 PM on 8/17/2023.  Primary care clinic: [phone]    "

## 2023-08-18 ENCOUNTER — MYC MEDICAL ADVICE (OUTPATIENT)
Dept: INTERNAL MEDICINE | Facility: CLINIC | Age: 73
End: 2023-08-18
Payer: MEDICARE

## 2023-08-18 PROBLEM — R10.11 RUQ ABDOMINAL PAIN: Status: ACTIVE | Noted: 2022-09-16

## 2023-08-18 PROBLEM — M54.50 ACUTE RIGHT-SIDED LOW BACK PAIN WITHOUT SCIATICA: Status: ACTIVE | Noted: 2023-08-18

## 2023-08-18 PROBLEM — I10 PRIMARY HYPERTENSION: Status: ACTIVE | Noted: 2023-08-18

## 2023-08-18 PROBLEM — Z12.31 ENCOUNTER FOR SCREENING MAMMOGRAM FOR BREAST CANCER: Status: ACTIVE | Noted: 2023-08-18

## 2023-08-18 PROBLEM — M89.9 BONE LESION: Status: ACTIVE | Noted: 2023-08-18

## 2023-08-18 PROBLEM — R10.31 RLQ ABDOMINAL PAIN: Status: RESOLVED | Noted: 2022-09-16 | Resolved: 2023-08-18

## 2023-08-18 PROBLEM — K59.00 CONSTIPATION, UNSPECIFIED CONSTIPATION TYPE: Status: ACTIVE | Noted: 2023-08-18

## 2023-08-18 PROCEDURE — 99000 SPECIMEN HANDLING OFFICE-LAB: CPT | Performed by: PATHOLOGY

## 2023-08-18 PROCEDURE — 87209 SMEAR COMPLEX STAIN: CPT | Performed by: HOSPITALIST

## 2023-08-18 NOTE — ASSESSMENT & PLAN NOTE
Last MRI of thoracic showed likely a hemangioma at T4, concerning lesion at T8. Patient had a biopsy of lesion at T7 after discussing with radiology on 7/6/23, results were benign.   - Will plan on repeating MRI thoracic spine with and without contrast, plan for November to reevaluate.

## 2023-08-18 NOTE — ASSESSMENT & PLAN NOTE
Was told to get an Molecular Breast Imaging of breasts after next mammogram by [facility]. Has breast implants.   - Will order screening cas mammogram for now.

## 2023-08-18 NOTE — ASSESSMENT & PLAN NOTE
Has perispinal tenderness along right lower back.   - Advised to continue using ice and heating pad for now.   - Continue with light exercises as tolerated.

## 2023-08-18 NOTE — ASSESSMENT & PLAN NOTE
Patient with slight improvement of pain to her RUQ, increased pain after eating. Had mild hepatocellular transaminitis on recent labs, lipase normal, CRP was normal, UA was normal. CT mentions a few small cysts along her abdomen but not other acute changes. Patient dose travel frequently with family, most recently to [facility]. Patient also had a recent T7 on 7/6/23 and possibly referred pain as pain does radiate from her right upper back. Other possible reason for abdominal pain, possible form constipation.   - Check Ultrasound of right upper abdomen.   - Labs for CBC with diff, CMP, CK and stool for Ova and parasite.   - Start Miralax daily for 7 days, then as needed after.

## 2023-08-19 ENCOUNTER — ANCILLARY PROCEDURE (OUTPATIENT)
Dept: ULTRASOUND IMAGING | Facility: CLINIC | Age: 73
End: 2023-08-19
Attending: HOSPITALIST
Payer: MEDICARE

## 2023-08-19 DIAGNOSIS — R10.11 RUQ ABDOMINAL PAIN: ICD-10-CM

## 2023-08-19 PROCEDURE — 76705 ECHO EXAM OF ABDOMEN: CPT | Performed by: STUDENT IN AN ORGANIZED HEALTH CARE EDUCATION/TRAINING PROGRAM

## 2023-08-21 LAB — O+P STL MICRO: NEGATIVE

## 2023-08-22 ENCOUNTER — MYC MEDICAL ADVICE (OUTPATIENT)
Dept: INTERNAL MEDICINE | Facility: CLINIC | Age: 73
End: 2023-08-22
Payer: MEDICARE

## 2023-08-22 DIAGNOSIS — K59.00 CONSTIPATION, UNSPECIFIED CONSTIPATION TYPE: Primary | ICD-10-CM

## 2023-08-22 RX ORDER — SENNA AND DOCUSATE SODIUM 50; 8.6 MG/1; MG/1
1 TABLET, FILM COATED ORAL 2 TIMES DAILY
Qty: 60 TABLET | Refills: 2 | Status: SHIPPED | OUTPATIENT
Start: 2023-08-22

## 2023-08-30 ENCOUNTER — OFFICE VISIT (OUTPATIENT)
Dept: OPHTHALMOLOGY | Facility: CLINIC | Age: 73
End: 2023-08-30
Attending: STUDENT IN AN ORGANIZED HEALTH CARE EDUCATION/TRAINING PROGRAM
Payer: MEDICARE

## 2023-08-30 DIAGNOSIS — H00.11 CHALAZION OF RIGHT UPPER EYELID: ICD-10-CM

## 2023-08-30 DIAGNOSIS — H40.003 GLAUCOMA SUSPECT OF BOTH EYES: Primary | ICD-10-CM

## 2023-08-30 PROCEDURE — G0463 HOSPITAL OUTPT CLINIC VISIT: HCPCS | Performed by: STUDENT IN AN ORGANIZED HEALTH CARE EDUCATION/TRAINING PROGRAM

## 2023-08-30 PROCEDURE — 99213 OFFICE O/P EST LOW 20 MIN: CPT | Performed by: STUDENT IN AN ORGANIZED HEALTH CARE EDUCATION/TRAINING PROGRAM

## 2023-08-30 PROCEDURE — 92083 EXTENDED VISUAL FIELD XM: CPT | Performed by: STUDENT IN AN ORGANIZED HEALTH CARE EDUCATION/TRAINING PROGRAM

## 2023-08-30 ASSESSMENT — REFRACTION_WEARINGRX
OS_ADD: +2.50
OD_SPHERE: +1.25
OD_AXIS: 178
OD_CYLINDER: +0.25
OS_SPHERE: -2.00
OS_CYLINDER: SPHERE
OD_ADD: +2.50

## 2023-08-30 ASSESSMENT — TONOMETRY
OD_IOP_MMHG: 17
OS_IOP_MMHG: 15
IOP_METHOD: ICARE

## 2023-08-30 ASSESSMENT — VISUAL ACUITY
OS_PH_CC: 20/25
OS_PH_CC+: -3
OS_CC: 20/40
OD_CC: 20/25
OD_CC+: -2
METHOD: SNELLEN - LINEAR

## 2023-08-30 ASSESSMENT — EXTERNAL EXAM - RIGHT EYE: OD_EXAM: NORMAL

## 2023-08-30 ASSESSMENT — PACHYMETRY
OS_CT(UM): 501
OD_CT(UM): 494

## 2023-08-30 ASSESSMENT — CONF VISUAL FIELD
OD_INFERIOR_NASAL_RESTRICTION: 0
OS_INFERIOR_TEMPORAL_RESTRICTION: 0
OS_INFERIOR_NASAL_RESTRICTION: 0
OD_INFERIOR_TEMPORAL_RESTRICTION: 0
METHOD: COUNTING FINGERS
OS_SUPERIOR_NASAL_RESTRICTION: 0
OD_SUPERIOR_TEMPORAL_RESTRICTION: 0
OD_NORMAL: 1
OS_NORMAL: 1
OD_SUPERIOR_NASAL_RESTRICTION: 0
OS_SUPERIOR_TEMPORAL_RESTRICTION: 0

## 2023-08-30 ASSESSMENT — EXTERNAL EXAM - LEFT EYE: OS_EXAM: NORMAL

## 2023-08-30 ASSESSMENT — SLIT LAMP EXAM - LIDS: COMMENTS: MGD

## 2023-08-30 NOTE — PROGRESS NOTES
HPI       Follow Up    In both eyes.  Associated symptoms include floaters.  Negative for eye pain and flashes.  Treatments tried include artificial tears.             Comments    Here for follow up. [facility] is about the same. Stable floaters. No flashes. No pain.    Kyrie Chairez COT 10:08 AM August 30, 2023             Last edited by Kyrie Chairez on 8/30/2023 10:08 AM.            Review of systems for the eyes was negative other than the pertinent positives/negatives listed in the HPI.    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     rigth left arytenoid cartilage, paramedian displacement of left true vocal cord    Cataract     Chronic osteoarthritis 2021, 22    Lots of tests    Gastroesophageal reflux disease without esophagitis     H/O degenerative disc disease     Hiatal hernia     Hyperlipidemia 2022    Jaw was repaired; eating again    Hypertension     Situational    Melanoma of skin (H)     s/p resection    Non-toxic multinodular goiter     Osteoarthritis     Pulmonary nodules     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    TMJ (temporomandibular joint syndrome) 01/11/2022    right jaw         Assessment & Plan      Marisa Vigil is a 73 year old female with the following diagnoses:    Right upper eyelid chalazion - resolved  MGD/Dry eyes OU  - PFATs QID and prn OU  - warm compresses at least BID OU x 5-10 min each time  - okay to discontinue erythromycin ophthalmic ointment at this time  - return precautions reviewed    Nuclear sclerotic cataract OU  - not affecting ADLs  - observe    History of LASIK OU  - in 1998  - observe    Posterior Vitreous Detachment OU  - noted previously  - no new flashes, floaters, or curtain down  "visual field  - not dilated today  - RD precautions    Glaucoma suspect OU  - previously saw Dr Aguilar  - family history of glaucoma  - pachy 494/501 (8/30/23)  - gonio (4-mirror, 8/30/23): open to cbb/ss 360 OU  - OCT RNFL 5/16/23  -- Right eye g87 bdln ST otherwise full  -- Left eye g81 bdln SN otherwise full  - OVF 24-2 (8/30/23)  -- Right eye nonspecific defect otherwise full   -- Left eye nonspecific defect otherwise full  - observe off drops, recheck 1 year    History of Transient left sided visual loss  - Per Dr Aguilar note: \"Patient was seen at the [facility] ER on 05/13/2023 for a TIA. Patient presents to the [facility] ophthalmology clinic today 05/16/2023 for follow up of TIA.  On 05/13/2023, patient complains of seeing letters disappearing on the left side of the visual field for 5-10 minutes, unsure which eye. Only one episode of transient vision loss, On chart review, MRI/MRA head and neck was done 05/13/2023 at the ED - negative for an acute stroke. Stroke neurologist feels it is migraine aura vs TIA. Recommends outpatient TIA work up to include HA1C, lipid panel, and an echo. Patient was discharged with daily aspirin\"  - no recurrence since  - return precautions reviewed    Drusen OU  - father with AMD  - patient is a nonsmoker  - green leafy vegetables  - amsler precautions    Epiretinal membrane, left eye  - Per Dr Aguilar, mild and noted on OCT macula  - amsler precautions    Counseled return/RD precautions    Patient disposition:   Return in about 1 year (around 8/30/2024) for Annual Visit, OCT Macula, OCT RNFL, OVF 24-2, or sooner changes.    Attending Physician Attestation:  Complete documentation of historical and exam elements from today's encounter can be found in the full encounter summary report (not reduplicated in this progress note).  I personally obtained the chief complaint(s) and history of present illness.  I confirmed and edited as necessary the review of systems, past medical/surgical " history, family history, social history, and examination findings as documented by others; and I examined the patient myself.  I personally reviewed the relevant tests, images, and reports as documented above.  I formulated and edited as necessary the assessment and plan and discussed the findings and management plan with the patient and family. . - Jordana Peraza MD

## 2023-08-30 NOTE — NURSING NOTE
Chief Complaints and History of Present Illnesses   Patient presents with    Follow Up     Chief Complaint(s) and History of Present Illness(es)       Follow Up              Laterality: both eyes    Associated symptoms: floaters.  Negative for eye pain and flashes    Treatments tried: artificial tears              Comments    Here for follow up. VA is about the same. Stable floaters. No flashes. No pain.    Kyrie RUIZ 10:08 AM August 30, 2023

## 2023-08-31 ENCOUNTER — TELEPHONE (OUTPATIENT)
Dept: GASTROENTEROLOGY | Facility: CLINIC | Age: 73
End: 2023-08-31
Payer: MEDICARE

## 2023-08-31 NOTE — TELEPHONE ENCOUNTER
"LPN received the following referral message. LPN spoke with patient and patient stated \"well my understanding from my doctor was that you guys are so busy, he gave me a referral to [facility] so I have an appointment with them tomorrow, but thank you for following up with me.\" LPN stated that this would be noted.     LORENZO Herron Katherine M, RN Lacher, Stacy, LPN Hello,    Please call and schedule with [facility]    Please do not hesitate to contact us with any questions or concerns.    Thanks,  Carin Schulz  RN, BSN  RN care coordinator  [address] [place] [id number]  Phone: [phone]  Fax: [phone]  After hours:[phone]  "

## 2023-09-01 ENCOUNTER — TRANSFERRED RECORDS (OUTPATIENT)
Dept: HEALTH INFORMATION MANAGEMENT | Facility: CLINIC | Age: 73
End: 2023-09-01
Payer: MEDICARE

## 2023-09-03 ASSESSMENT — SLIT LAMP EXAM - LIDS: COMMENTS: MGD

## 2023-09-08 ENCOUNTER — MYC MEDICAL ADVICE (OUTPATIENT)
Dept: INTERNAL MEDICINE | Facility: CLINIC | Age: 73
End: 2023-09-08
Payer: MEDICARE

## 2023-09-08 DIAGNOSIS — M54.9 UPPER BACK PAIN: ICD-10-CM

## 2023-09-08 DIAGNOSIS — M54.41 CHRONIC MIDLINE LOW BACK PAIN WITH RIGHT-SIDED SCIATICA: Primary | ICD-10-CM

## 2023-09-08 DIAGNOSIS — G89.29 CHRONIC MIDLINE LOW BACK PAIN WITH RIGHT-SIDED SCIATICA: Primary | ICD-10-CM

## 2023-09-14 ENCOUNTER — ANCILLARY PROCEDURE (OUTPATIENT)
Dept: GENERAL RADIOLOGY | Facility: CLINIC | Age: 73
End: 2023-09-14
Attending: HOSPITALIST
Payer: MEDICARE

## 2023-09-14 DIAGNOSIS — M54.9 UPPER BACK PAIN: ICD-10-CM

## 2023-09-14 PROCEDURE — 72070 X-RAY EXAM THORAC SPINE 2VWS: CPT | Mod: TC | Performed by: RADIOLOGY

## 2023-09-17 ENCOUNTER — HOSPITAL ENCOUNTER (OUTPATIENT)
Dept: MRI IMAGING | Facility: CLINIC | Age: 73
Discharge: HOME | End: 2023-09-17
Attending: INTERNAL MEDICINE | Admitting: INTERNAL MEDICINE
Payer: MEDICARE

## 2023-09-17 DIAGNOSIS — K59.09 CHRONIC CONSTIPATION: ICD-10-CM

## 2023-09-17 PROCEDURE — 255N000002 HC RX 255 OP 636: Performed by: INTERNAL MEDICINE

## 2023-09-17 PROCEDURE — 74183 MRI ABD W/O CNTR FLWD CNTR: CPT

## 2023-09-17 PROCEDURE — A9585 GADOBUTROL INJECTION: HCPCS | Performed by: INTERNAL MEDICINE

## 2023-09-17 RX ORDER — GADOBUTROL 604.72 MG/ML
5.5 INJECTION INTRAVENOUS ONCE
Status: COMPLETED | OUTPATIENT
Start: 2023-09-17 | End: 2023-09-17

## 2023-09-17 RX ADMIN — GADOBUTROL 5.5 ML: 604.72 INJECTION INTRAVENOUS at 08:40

## 2023-09-27 ENCOUNTER — TRANSFERRED RECORDS (OUTPATIENT)
Dept: HEALTH INFORMATION MANAGEMENT | Facility: CLINIC | Age: 73
End: 2023-09-27
Payer: MEDICARE

## 2023-09-28 ENCOUNTER — MYC MEDICAL ADVICE (OUTPATIENT)
Dept: INTERNAL MEDICINE | Facility: CLINIC | Age: 73
End: 2023-09-28
Payer: MEDICARE

## 2023-10-02 ENCOUNTER — HOSPITAL ENCOUNTER (OUTPATIENT)
Dept: MAMMOGRAPHY | Facility: CLINIC | Age: 73
Discharge: HOME | End: 2023-10-02
Attending: HOSPITALIST | Admitting: HOSPITALIST
Payer: MEDICARE

## 2023-10-02 DIAGNOSIS — Z12.31 ENCOUNTER FOR SCREENING MAMMOGRAM FOR BREAST CANCER: ICD-10-CM

## 2023-10-02 PROCEDURE — 77063 BREAST TOMOSYNTHESIS BI: CPT

## 2023-10-19 ENCOUNTER — MYC MEDICAL ADVICE (OUTPATIENT)
Dept: INTERNAL MEDICINE | Facility: CLINIC | Age: 73
End: 2023-10-19
Payer: MEDICARE

## 2023-10-22 ENCOUNTER — OFFICE VISIT (OUTPATIENT)
Dept: URGENT CARE | Facility: URGENT CARE | Age: 73
End: 2023-10-22
Payer: MEDICARE

## 2023-10-22 VITALS
HEART RATE: 89 BPM | RESPIRATION RATE: 16 BRPM | TEMPERATURE: 97.8 F | BODY MASS INDEX: 22.5 KG/M2 | OXYGEN SATURATION: 99 % | WEIGHT: 119.1 LBS | DIASTOLIC BLOOD PRESSURE: 95 MMHG | SYSTOLIC BLOOD PRESSURE: 179 MMHG

## 2023-10-22 DIAGNOSIS — Z87.19: Primary | ICD-10-CM

## 2023-10-22 PROCEDURE — 99213 OFFICE O/P EST LOW 20 MIN: CPT | Performed by: FAMILY MEDICINE

## 2023-10-22 NOTE — PROGRESS NOTES
SUBJECTIVE:   Marisa Vigil is a 73 year old female presenting with a chief complaint of a one-time episode of noticing a few drops of blood on the bed sheets. She does not know of the source of the bleeding; however, she felt blood at the back of the mouth and denied any nose bleeds/facial scratches/cuts.    The bleeding has not recurred since then. .     Two days ago, patient experienced posterior left mouth, left jaw, left teeth, left posterior neck had throbbing pain.  There has been neck stiffness since October 20, 2023. The pain has improved since then.   Patient started taking Amoxicillin on October 20, 2023 (the Rx was filled on October 31, 2022).  The above symptoms improved after taking two doses.      This morning, patient felt swelling sensation at the posterior left neck.    No fevers.  No chills.      No recent cuts/scratches on the face/scalp/ears.    No recent ear pain  No decreased hearing out of the left ear.    No recent ear pain.   No sore throat.    No recent nose bleeds.    No history of blood clotting disorders.     Patient is on Aspirin and is not on other blood thinners.  .  .  Onset of symptoms was three days ago.      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     rigth left arytenoid cartilage, paramedian displacement of left true vocal cord    Cataract     Chronic osteoarthritis 2021, 22    Lots of tests    Gastroesophageal reflux disease without esophagitis     H/O degenerative disc disease     Hiatal hernia     Hyperlipidemia 2022    Jaw was repaired; eating again    Hypertension     Situational    Melanoma of skin (H)     s/p resection    Non-toxic multinodular goiter     Osteoarthritis     Pulmonary nodules     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    TMJ (temporomandibular joint syndrome) 01/11/2022    right jaw     Current Outpatient  Medications   Medication Sig Dispense Refill    aspirin 81 MG EC tablet Take 1 tablet (81 mg) by mouth daily      calcium citrate and vitamin D (CITRACAL) 200-250 MG-UNIT TABS per tablet Take 1 tablet by mouth 2 times daily      levothyroxine (SYNTHROID/LEVOTHROID) 88 MCG tablet Take 1 tablet (88 mcg) by mouth daily Every day but Sunday 90 tablet 3    magnesium hydroxide (MILK OF MAGNESIA) 400 MG/5ML suspension Take 15 mLs by mouth daily as needed for constipation or heartburn 118 mL 0    multivitamin w/minerals (THERA-VIT-M) tablet Take 1 tablet by mouth daily      diclofenac (VOLTAREN) 50 MG EC tablet Take 1 tablet (50 mg) by mouth 2 times daily (Patient not taking: Reported on 10/22/2023) 28 tablet 1    erythromycin (ROMYCIN) 5 MG/GM ophthalmic ointment Place 0.5 inches into the right eye At Bedtime (Patient not taking: Reported on 10/22/2023) 7 g 1    polyethylene glycol (MIRALAX) 17 g packet Take 17 g by mouth daily as needed for constipation Take daily for 1 week to start, then as needed. (Patient not taking: Reported on 10/22/2023) 30 packet 1    SENNA-docusate sodium (SENNA S) 8.6-50 MG tablet Take 1 tablet by mouth 2 times daily (Patient not taking: Reported on 10/22/2023) 60 tablet 2     Social History     Tobacco Use    Smoking status: Never    Smokeless tobacco: Never    Tobacco comments:     Never used tobacco products   Substance Use Topics    Alcohol use: Yes     Alcohol/week: 1.0 standard drink of alcohol     Types: 1 Standard drinks or equivalent per week     Comment: Not often       ROS:  CONSTITUTIONAL:NEGATIVE  for fever.    ENT/MOUTH: patient had recent pain at the left jaw, left teeth, left posterior neck but these symptoms have improved.      OBJECTIVE:  BP (!) 179/95   Pulse 89   Temp 97.8  F (36.6  C)   Resp 16   Wt 54 kg (119 lb 1.6 oz)   SpO2 99%   BMI 22.50 kg/m    GENERAL APPEARANCE: healthy, alert and patient looks anxious.    EYES: eyelids, eyes have no evidence of scabs/recent  hemorrhage/abrasions/lacerations.  No ecchymosis.    HENT: Ears:  canals are within normal limits.  The TMs have no perforations and no signs of recent hemorrhage.  TMJs have no tenderness with palpation. No evidence of tonsillar erythema/abscesses.  The uvula is midline.   The oropharynx is within normal limits.  The tongue, gums, teeth, palate are within normal limits.    NECK: supple, nontender, no lymphadenopathy.  Normal ROM at the neck.   SKIN: No facial/neck lacerations/abrasions/scabs.      ASSESSMENT:  History of oral hemorrhage.  I was unable to find evidence of recent bleeding from the skin of the face/from the ears/from the nose/mouth.      PLAN:  Patient has already scheduled an appointment with her primary care provider for October 26, 2023.  Patient will be evaluated by her primary care provider that day.      Take tylenol/Ibuprofen for the pain.      Jah Bernard MD

## 2023-10-22 NOTE — PATIENT INSTRUCTIONS
Follow up with your primary care provider as scheduled on October 26, 2023, for further evaluation of your current concerns.      Take Tylenol, Ibuprofen for pain.

## 2023-10-26 ENCOUNTER — LAB (OUTPATIENT)
Dept: LAB | Facility: CLINIC | Age: 73
End: 2023-10-26
Payer: MEDICARE

## 2023-10-26 ENCOUNTER — OFFICE VISIT (OUTPATIENT)
Dept: INTERNAL MEDICINE | Facility: CLINIC | Age: 73
End: 2023-10-26
Payer: MEDICARE

## 2023-10-26 VITALS
OXYGEN SATURATION: 97 % | HEART RATE: 73 BPM | DIASTOLIC BLOOD PRESSURE: 83 MMHG | BODY MASS INDEX: 22.39 KG/M2 | SYSTOLIC BLOOD PRESSURE: 143 MMHG | WEIGHT: 118.5 LBS

## 2023-10-26 DIAGNOSIS — R10.13 EPIGASTRIC PAIN: ICD-10-CM

## 2023-10-26 DIAGNOSIS — M81.0 AGE RELATED OSTEOPOROSIS, UNSPECIFIED PATHOLOGICAL FRACTURE PRESENCE: Primary | ICD-10-CM

## 2023-10-26 DIAGNOSIS — K21.00 GASTROESOPHAGEAL REFLUX DISEASE WITH ESOPHAGITIS WITHOUT HEMORRHAGE: ICD-10-CM

## 2023-10-26 DIAGNOSIS — E78.5 HYPERLIPIDEMIA, UNSPECIFIED HYPERLIPIDEMIA TYPE: ICD-10-CM

## 2023-10-26 DIAGNOSIS — Z23 NEED FOR PNEUMOCOCCAL VACCINE: ICD-10-CM

## 2023-10-26 DIAGNOSIS — M89.9 BONE LESION: ICD-10-CM

## 2023-10-26 DIAGNOSIS — E78.00 PURE HYPERCHOLESTEROLEMIA: Primary | ICD-10-CM

## 2023-10-26 DIAGNOSIS — G45.9 TIA (TRANSIENT ISCHEMIC ATTACK): ICD-10-CM

## 2023-10-26 PROBLEM — R10.11 RUQ ABDOMINAL PAIN: Status: RESOLVED | Noted: 2022-09-16 | Resolved: 2023-10-26

## 2023-10-26 LAB
CHOLEST SERPL-MCNC: 224 MG/DL
HDLC SERPL-MCNC: 87 MG/DL
LDLC SERPL CALC-MCNC: 121 MG/DL
NONHDLC SERPL-MCNC: 137 MG/DL
TRIGL SERPL-MCNC: 81 MG/DL

## 2023-10-26 PROCEDURE — 80061 LIPID PANEL: CPT | Performed by: PATHOLOGY

## 2023-10-26 PROCEDURE — 99214 OFFICE O/P EST MOD 30 MIN: CPT | Mod: 25 | Performed by: HOSPITALIST

## 2023-10-26 PROCEDURE — G0009 ADMIN PNEUMOCOCCAL VACCINE: HCPCS | Performed by: HOSPITALIST

## 2023-10-26 PROCEDURE — 36415 COLL VENOUS BLD VENIPUNCTURE: CPT | Performed by: PATHOLOGY

## 2023-10-26 PROCEDURE — 90677 PCV20 VACCINE IM: CPT | Performed by: HOSPITALIST

## 2023-10-26 RX ORDER — OMEPRAZOLE 40 MG/1
40 CAPSULE, DELAYED RELEASE ORAL DAILY
COMMUNITY
End: 2023-10-26

## 2023-10-26 RX ORDER — OMEPRAZOLE 40 MG/1
40 CAPSULE, DELAYED RELEASE ORAL DAILY PRN
Qty: 90 CAPSULE | Refills: 2 | Status: SHIPPED | OUTPATIENT
Start: 2023-10-26

## 2023-10-26 RX ORDER — ROSUVASTATIN CALCIUM 20 MG/1
20 TABLET, COATED ORAL DAILY
Qty: 90 TABLET | Refills: 3 | Status: SHIPPED | OUTPATIENT
Start: 2023-10-26

## 2023-10-26 RX ORDER — RESPIRATORY SYNCYTIAL VIRUS VACCINE 120MCG/0.5
0.5 KIT INTRAMUSCULAR ONCE
Qty: 1 EACH | Refills: 0 | Status: CANCELLED | OUTPATIENT
Start: 2023-10-26 | End: 2023-10-26

## 2023-10-26 ASSESSMENT — ENCOUNTER SYMPTOMS
CHILLS: 0
SHORTNESS OF BREATH: 0
FEVER: 0
ABDOMINAL PAIN: 1
BACK PAIN: 1
NERVOUS/ANXIOUS: 1
DIARRHEA: 0
DYSURIA: 0
HEMATOCHEZIA: 0
CONSTIPATION: 0

## 2023-10-26 NOTE — ASSESSMENT & PLAN NOTE
Patient had reaction to Atorvastatin earlier this year and stopped use. Has been working on diet.   - Will recheck Lipid panel.

## 2023-10-26 NOTE — PROGRESS NOTES
Assessment/Plan  Problem List Items Addressed This Visit       Gastroesophageal reflux disease with esophagitis without hemorrhage     Patient had previously has RUQ that has improved. Denied any pains but on exam has LUQ and epigastric mild tenderness. Recently woke 1 week ago with some blood on her pillow and neck pains. Improving with use of omeprazole the past 4 days. No gum or dental abnormalities on exam. No obvious oropharynx lesions or erythema on exam. May have gastric ulcer.   - Omeprezole 40mg twice a day for 2 weeks, then 40mg daily for 4 weeks, then as needed.   - Hold off on Aspirin for 1 week, then may return to use.   - After 6 weeks, if discomfort comes back in chest or throat then we should check an H. Pylori test. For H. Pylori, it's a stool test (would need to be off omeprazole for 2 weeks).   - If swallowing worsens, let Dr. Conde know.          Relevant Medications    omeprazole (PRILOSEC) 40 MG DR capsule    Osteoporosis - Primary     - Recheck bone density scan.   - Restart on Prolia on or after 12/6/23, stop Reclast. Will need nurse visit. Will also need calcium and phosphorus checked prior.          Relevant Medications    denosumab (PROLIA) injection 60 mg (Start on 12/6/2023 12:00 AM)    denosumab (PROLIA) injection 60 mg    Other Relevant Orders    DEXA HIP/PELVIS/SPINE - Future    Calcium    Phosphorus    Hyperlipidemia, unspecified hyperlipidemia type     Patient had reaction to Atorvastatin earlier this year and stopped use. Has been working on diet.   - Will recheck Lipid panel.          Relevant Orders    Lipid panel reflex to direct LDL Non-fasting (Completed)    Bone lesion     Last MRI of thoracic showed likely a hemangioma at T4, concerning lesion at T8. Patient had a biopsy of lesion at T7 after discussing with radiology on 7/6/23, results were benign.   - Repeating MRI thoracic spine on 11/1/23.         Epigastric pain     Other Visit Diagnoses       Need for pneumococcal  vaccine        Relevant Orders    PNEUMOCOCCAL 20 VALENT CONJUGATE (PREVNAR 20) (Completed)            No results found for any visits on 10/26/23.    Health Maintenance Due   Topic Date Due    DEXA  Never done    ANNUAL REVIEW OF  ORDERS  Never done    ADVANCE CARE PLANNING  Never done    HEPATITIS C SCREENING  Never done    DTAP/TDAP/TD IMMUNIZATION (1 - Tdap) Never done    RSV VACCINE 60+ (1 - 1-dose 60+ series) Never done    MEDICARE ANNUAL WELLNESS VISIT  09/12/2020    COVID-19 Vaccine (4 - 2023-24 season) 09/01/2023           Subjective  Had 5 steroid injections with Dr. Agus Au at [facility] to her back and had improved. Able to walk 2 miles. Does upper back pains if she goes further.     No longer having right sided abdominal pain.     Had a clean out with miralax and had an explosive diarrhea. Was told to be on miralax every other day for now.  Started Ibgard but had acid reflux. Needed omeprazole 40mg and has been taking for 4 days now. In early September is when she developed bad reflux. Feels some chest pressure as she had before.     Mom and Aunt came down with breast cancer.     Other day had pinching of her neck and left face. Had pains with swallowing to the back her throat. Had blood on her pillow on Thursday. Pain has been getting better with use of omeprazole. Went to Urgent care at [place] and told to follow up with me.      Does not want to be back on Reclast. Would like to recheck bone density. Had been on prolia in the past.         Review of Systems   Constitutional:  Negative for chills and fever.   Respiratory:  Negative for shortness of breath.    Cardiovascular:  Positive for chest pain. Negative for peripheral edema.   Gastrointestinal:  Positive for abdominal pain. Negative for constipation, diarrhea and hematochezia.   Genitourinary:  Negative for dysuria.   Musculoskeletal:  Positive for back pain.   Psychiatric/Behavioral:  The patient is nervous/anxious.        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     rigth left arytenoid cartilage, paramedian displacement of left true vocal cord    Cataract     Chronic osteoarthritis 2021, 22    Lots of tests    Gastroesophageal reflux disease without esophagitis     H/O degenerative disc disease     Hiatal hernia     Hyperlipidemia 2022    Jaw was repaired; eating again    Hypertension     Situational    Melanoma of skin (H)     s/p resection    Non-toxic multinodular goiter     Osteoarthritis     Pulmonary nodules     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 Saldaña, [facility]    LASIK  1998    LITHOTRIPSY  2007    ORTHOPEDIC SURGERY  2005;2007    L shoulder; R hip torn labrum    [place] BRE[place]UG[place]  2014    [place] FACE LIFT      partial    REPAIR PTOSIS  2009    Partial face lift    SURGICAL PATHOLOGY EXAM      left hip lipoma excision    WISDOM TOOTH EXTRACTION  1969       Family History   Problem Relation Age of Onset    Breast Cancer Mother     Osteoporosis Mother     Cancer Mother         Breast cancer  early 50 s    Hyperlipidemia Mother     Arthritis Mother     Macular Degeneration Father     Glaucoma Father     Heart Disease Father         Heart issues & brain aneurysm    Atrial fibrillation Father     Hypertension Father     Osteoporosis Paternal Grandmother      Diabetes Brother         Diabetes    Breast Cancer Other         Mother s Sister DX Jan 2022; returned summer 2023       Social History     Tobacco Use    Smoking status: Never    Smokeless tobacco: Never    Tobacco comments:     Never used tobacco products   Substance Use Topics    Alcohol use: Yes     Alcohol/week: 1.0 standard drink of alcohol     Types: 1 Standard drinks or equivalent per week     Comment: Not often        Objective  BP (!) 158/106 (BP Location: Right arm, Patient Position: Sitting, Cuff Size: Adult Regular)   Pulse 73   Wt 53.8 kg (118 lb 8 oz)   SpO2 97%   BMI 22.39 kg/m    Vitals taken by Michael Conde MD    Physical Exam  HENT:      Mouth/Throat:      Mouth: Mucous membranes are dry.      Pharynx: No oropharyngeal exudate or posterior oropharyngeal erythema.   Eyes:      Conjunctiva/sclera: Conjunctivae normal.   Neck:      Comments: Mild tenderness when pushing along left side of upper trachea.   Cardiovascular:      Rate and Rhythm: Regular rhythm.      Heart sounds: Normal heart sounds. No murmur heard.     No friction rub. No gallop.   Pulmonary:      Effort: Pulmonary effort is normal. No respiratory distress.      Breath sounds: Normal breath sounds. No wheezing, rhonchi or rales.   Abdominal:      General: Bowel sounds are normal. There is no distension.      Palpations: Abdomen is soft.      Tenderness: There is abdominal tenderness. There is no guarding or rebound.      Comments: Epigastric tenderness, left upper abdominal mild tenderness.    Musculoskeletal:      Cervical back: Neck supple.   Lymphadenopathy:      Cervical: No cervical adenopathy.   Neurological:      Mental Status: She is alert.         30 minutes spent on the date of the encounter doing chart review, history and exam, documentation and further activities per the note.      Return in about 3 months (around 1/26/2024).      Michael Conde MD  [facility] INTERNAL MEDICINE  [place]

## 2023-10-26 NOTE — ASSESSMENT & PLAN NOTE
Last MRI of thoracic showed likely a hemangioma at T4, concerning lesion at T8. Patient had a biopsy of lesion at T7 after discussing with radiology on 7/6/23, results were benign.   - Repeating MRI thoracic spine on 11/1/23.

## 2023-10-26 NOTE — PROGRESS NOTES
Marisa is a 73 year old that presents in clinic today for the following:     Chief Complaint   Patient presents with    Follow Up     Pt here for follow up     Gastrointestinal Problem     Pt would like to discuss recent visits with GI and current medication schedule            10/26/2023     1:58 PM   Additional Questions   Roomed by SARIKAL, EMT       Screenings from encounters over the past 10 days    No data recorded       Jackson Suazo at 2:03 PM on 10/26/2023

## 2023-10-26 NOTE — PATIENT INSTRUCTIONS
- Omeprezole 40mg twice a day for 2 weeks, then 40mg daily for 4 weeks, then as needed.     - Hold off on Aspirin for 1 week, then may return to use.   - After 6 weeks, if discomfort comes back in chest or throat then we should check an H. Pylori test. For H. Pylori, it's a stool test (would need to be off omeprazole for 2 weeks).   - If swallowing worsens, let Dr. Conde know.     - Recheck bone density scan.   - Restart on Prolia on or after 12/6/23, stop Reclast. Will need nurse visit. Will also need calcium and phosphorus checked.     - Recheck Lipids today, non fasting.     - Give Prevnar 20 today.       Follow up again in 3 months. May cancel 11/20/23 visit.

## 2023-10-26 NOTE — ASSESSMENT & PLAN NOTE
Patient had previously has RUQ that has improved. Denied any pains but on exam has LUQ and epigastric mild tenderness. Recently woke 1 week ago with some blood on her pillow and neck pains. Improving with use of omeprazole the past 4 days. No gum or dental abnormalities on exam. No obvious oropharynx lesions or erythema on exam. May have gastric ulcer.   - Omeprezole 40mg twice a day for 2 weeks, then 40mg daily for 4 weeks, then as needed.   - Hold off on Aspirin for 1 week, then may return to use.   - After 6 weeks, if discomfort comes back in chest or throat then we should check an H. Pylori test. For H. Pylori, it's a stool test (would need to be off omeprazole for 2 weeks).   - If swallowing worsens, let Dr. Conde know.

## 2023-10-26 NOTE — ASSESSMENT & PLAN NOTE
- Recheck bone density scan.   - Restart on Prolia on or after 12/6/23, stop Reclast. Will need nurse visit. Will also need calcium and phosphorus checked prior.

## 2023-11-01 ENCOUNTER — HOSPITAL ENCOUNTER (OUTPATIENT)
Dept: MRI IMAGING | Facility: CLINIC | Age: 73
Discharge: HOME | End: 2023-11-01
Attending: HOSPITALIST | Admitting: HOSPITALIST
Payer: MEDICARE

## 2023-11-01 DIAGNOSIS — M89.9 BONE LESION: ICD-10-CM

## 2023-11-01 PROCEDURE — A9585 GADOBUTROL INJECTION: HCPCS | Performed by: HOSPITALIST

## 2023-11-01 PROCEDURE — 255N000002 HC RX 255 OP 636: Performed by: HOSPITALIST

## 2023-11-01 PROCEDURE — G1010 CDSM STANSON: HCPCS

## 2023-11-01 RX ORDER — GADOBUTROL 604.72 MG/ML
5 INJECTION INTRAVENOUS ONCE
Status: COMPLETED | OUTPATIENT
Start: 2023-11-01 | End: 2023-11-01

## 2023-11-01 RX ADMIN — GADOBUTROL 5 ML: 604.72 INJECTION INTRAVENOUS at 10:02

## 2023-11-06 ENCOUNTER — APPOINTMENT (OUTPATIENT)
Dept: CT IMAGING | Facility: CLINIC | Age: 73
End: 2023-11-06
Attending: EMERGENCY MEDICINE
Payer: MEDICARE

## 2023-11-06 ENCOUNTER — HOSPITAL ENCOUNTER (EMERGENCY)
Facility: CLINIC | Age: 73
Discharge: HOME | End: 2023-11-06
Attending: EMERGENCY MEDICINE | Admitting: EMERGENCY MEDICINE
Payer: MEDICARE

## 2023-11-06 VITALS
DIASTOLIC BLOOD PRESSURE: 100 MMHG | OXYGEN SATURATION: 97 % | SYSTOLIC BLOOD PRESSURE: 142 MMHG | RESPIRATION RATE: 20 BRPM | HEART RATE: 81 BPM | TEMPERATURE: 97.4 F

## 2023-11-06 DIAGNOSIS — R51.9 RIGHT FACIAL PAIN: ICD-10-CM

## 2023-11-06 LAB
ANION GAP SERPL CALCULATED.3IONS-SCNC: 12 MMOL/L (ref 7–15)
BASOPHILS # BLD AUTO: 0.1 10E3/UL (ref 0–0.2)
BASOPHILS NFR BLD AUTO: 1 %
BUN SERPL-MCNC: 10.7 MG/DL (ref 8–23)
CALCIUM SERPL-MCNC: 9.7 MG/DL (ref 8.8–10.2)
CHLORIDE SERPL-SCNC: 98 MMOL/L (ref 98–107)
CREAT SERPL-MCNC: 0.74 MG/DL (ref 0.51–0.95)
DEPRECATED HCO3 PLAS-SCNC: 25 MMOL/L (ref 22–29)
EGFRCR SERPLBLD CKD-EPI 2021: 85 ML/MIN/1.73M2
EOSINOPHIL # BLD AUTO: 0.2 10E3/UL (ref 0–0.7)
EOSINOPHIL NFR BLD AUTO: 3 %
ERYTHROCYTE [DISTWIDTH] IN BLOOD BY AUTOMATED COUNT: 12.6 % (ref 10–15)
GLUCOSE SERPL-MCNC: 101 MG/DL (ref 70–99)
HCT VFR BLD AUTO: 42.3 % (ref 35–47)
HGB BLD-MCNC: 13.7 G/DL (ref 11.7–15.7)
IMM GRANULOCYTES # BLD: 0 10E3/UL
IMM GRANULOCYTES NFR BLD: 0 %
LYMPHOCYTES # BLD AUTO: 1.9 10E3/UL (ref 0.8–5.3)
LYMPHOCYTES NFR BLD AUTO: 28 %
MCH RBC QN AUTO: 29.5 PG (ref 26.5–33)
MCHC RBC AUTO-ENTMCNC: 32.4 G/DL (ref 31.5–36.5)
MCV RBC AUTO: 91 FL (ref 78–100)
MONOCYTES # BLD AUTO: 0.4 10E3/UL (ref 0–1.3)
MONOCYTES NFR BLD AUTO: 7 %
NEUTROPHILS # BLD AUTO: 4.2 10E3/UL (ref 1.6–8.3)
NEUTROPHILS NFR BLD AUTO: 61 %
NRBC # BLD AUTO: 0 10E3/UL
NRBC BLD AUTO-RTO: 0 /100
PLATELET # BLD AUTO: 290 10E3/UL (ref 150–450)
POTASSIUM SERPL-SCNC: 4.1 MMOL/L (ref 3.4–5.3)
RBC # BLD AUTO: 4.64 10E6/UL (ref 3.8–5.2)
SODIUM SERPL-SCNC: 135 MMOL/L (ref 135–145)
WBC # BLD AUTO: 6.8 10E3/UL (ref 4–11)

## 2023-11-06 PROCEDURE — 250N000009 HC RX 250: Performed by: EMERGENCY MEDICINE

## 2023-11-06 PROCEDURE — 70491 CT SOFT TISSUE NECK W/DYE: CPT | Mod: MA

## 2023-11-06 PROCEDURE — 80048 BASIC METABOLIC PNL TOTAL CA: CPT | Performed by: EMERGENCY MEDICINE

## 2023-11-06 PROCEDURE — 250N000011 HC RX IP 250 OP 636: Performed by: EMERGENCY MEDICINE

## 2023-11-06 PROCEDURE — 85025 COMPLETE CBC W/AUTO DIFF WBC: CPT | Performed by: EMERGENCY MEDICINE

## 2023-11-06 PROCEDURE — 99285 EMERGENCY DEPT VISIT HI MDM: CPT | Mod: 25

## 2023-11-06 PROCEDURE — 36415 COLL VENOUS BLD VENIPUNCTURE: CPT | Performed by: EMERGENCY MEDICINE

## 2023-11-06 RX ORDER — IOPAMIDOL 755 MG/ML
90 INJECTION, SOLUTION INTRAVASCULAR ONCE
Status: COMPLETED | OUTPATIENT
Start: 2023-11-06 | End: 2023-11-06

## 2023-11-06 RX ADMIN — IOPAMIDOL 90 ML: 755 INJECTION, SOLUTION INTRAVENOUS at 12:39

## 2023-11-06 RX ADMIN — SODIUM CHLORIDE 65 ML: 9 INJECTION, SOLUTION INTRAVENOUS at 12:39

## 2023-11-06 ASSESSMENT — ACTIVITIES OF DAILY LIVING (ADL)
ADLS_ACUITY_SCORE: 35

## 2023-11-06 NOTE — DISCHARGE INSTRUCTIONS
Please return to the emergency department if your symptoms increase, you experience an increase in pain, swelling, or inability to move your jaw.    Please make sure that you follow-up with your ear nose and throat doctor next week.  Please follow-up with your primary care physician.  You can also follow-up with your surgeon at [facility].    You can use ice.  You can use anti-inflammatory such as ibuprofen or diclofenac.

## 2023-11-06 NOTE — ED PROVIDER NOTES
History     Chief Complaint:  Jaw Pain       HPI   Marisa Vigil is a 73 year old female present with right sided facial pain.  Patient reports that she had a temporomandibular joint replacement in 2021.  She states that she started having right-sided jaw pain several days ago.  She has not had any trauma.  She has not had any fevers, nausea, vomiting, or tooth pain.  She has been taking anti-inflammatories that have helped.      Independent Historian:   None - Patient Only    Review of External Notes:          Medications:    aspirin 81 MG EC tablet  calcium citrate and vitamin D (CITRACAL) 200-250 MG-UNIT TABS per tablet  diclofenac (VOLTAREN) 50 MG EC tablet  levothyroxine (SYNTHROID/LEVOTHROID) 88 MCG tablet  magnesium hydroxide (MILK OF MAGNESIA) 400 MG/5ML suspension  multivitamin w/minerals (THERA-VIT-M) tablet  omeprazole (PRILOSEC) 40 MG DR capsule  polyethylene glycol (MIRALAX) 17 g packet  rosuvastatin (CRESTOR) 20 MG tablet  SENNA-docusate sodium (SENNA S) 8.6-50 MG tablet        Past Medical History:    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     Cataract     Chronic osteoarthritis 2021, 22    Gastroesophageal reflux disease without esophagitis     H/O degenerative disc disease     Hiatal hernia     Hyperlipidemia 2022    Hypertension     Melanoma of skin (H)     Non-toxic multinodular goiter     Osteoarthritis     Pulmonary nodules     Squamous cell carcinoma in situ     TMJ (temporomandibular joint syndrome) 01/11/2022       Past Surgical History:    Past Surgical History:   Procedure Laterality Date    BREAST SURGERY  2013    Right duct glands removed leakage    COLONOSCOPY      2005, 2014    COSMETIC SURGERY  2009;2011    Face lift; breast implants    deviated sep      EXCISION / BIOPSY BREAST / NIPPLE / DUCT  2013    right breast duct glands removed    EYE  SURGERY  2020    left eyelid    HEART CATH LEFT HEART CATH  01/27/2021    Normal    JOINT REPLACEMENT  Jan 2022    R jaw total replacement Dr. Saldaña, [facility]    LASIK  1998    LITHOTRIPSY  2007    ORTHOPEDIC SURGERY  2005;2007    L shoulder; R hip torn labrum    [place]  2014    [place] L[place]      partial    REPAIR PTOSIS  2009    Partial face lift    SURGICAL PATHOLOGY EXAM      left hip lipoma excision    WISDOM TOOTH EXTRACTION  1969        Physical Exam   Patient Vitals for the past 24 hrs:   BP Temp Temp src Pulse Resp SpO2   11/06/23 1735 (!) 142/100 -- -- 81 -- 97 %   11/06/23 1020 (!) 175/131 97.4  F (36.3  C) Temporal 90 20 100 %        Physical Exam  General: Well-nourished, resting comfortably when I enter the room  Eyes: Pupils equal, conjunctivae pink no scleral icterus or conjunctival injection  ENT:  Moist mucus membranes.  Jaw opens fully, no trismus.  No tenderness to palpation of the jawbone.  Tenderness to palpation of the teeth.  Uvula is midline.  No tonsillar swelling or exudates.  Mild tenderness over the right temporomandibular joint.  No swelling, deformities, skin changes.  Respiratory:  Lungs clear to auscultation bilaterally, no crackles/rubs/wheezes.  Good air movement  CV: Normal rate and rhythm, no murmurs  GI:  Abdomen soft and non-distended.  No tenderness, guarding or rebound  Skin: Warm, dry.  No rashes or petechiae  Musculoskeletal: No peripheral edema or calf tenderness  Neuro: Alert and oriented to person/place/time  Psychiatric: Normal affect      Emergency Department Course       Imaging:  Soft tissue neck CT w contrast   Final Result   IMPRESSION: No evidence of acute infectious or inflammatory process.      CONI LLAMAS MD            SYSTEM ID:  [id number]             Laboratory:  Labs Ordered and Resulted from Time of ED Arrival to Time of ED Departure   BASIC METABOLIC PANEL - Abnormal       Result Value    Sodium 135      Potassium 4.1       Chloride 98      Carbon Dioxide (CO2) 25      Anion Gap 12      Urea Nitrogen 10.7      Creatinine 0.74      GFR Estimate 85      Calcium 9.7      Glucose 101 (*)    CBC WITH PLATELETS AND DIFFERENTIAL    WBC Count 6.8      RBC Count 4.64      Hemoglobin 13.7      Hematocrit 42.3      MCV 91      MCH 29.5      MCHC 32.4      RDW 12.6      Platelet Count 290      % Neutrophils 61      % Lymphocytes 28      % Monocytes 7      % Eosinophils 3      % Basophils 1      % Immature Granulocytes 0      NRBCs per 100 WBC 0      Absolute Neutrophils 4.2      Absolute Lymphocytes 1.9      Absolute Monocytes 0.4      Absolute Eosinophils 0.2      Absolute Basophils 0.1      Absolute Immature Granulocytes 0.0      Absolute NRBCs 0.0          Procedures       Emergency Department Course & Assessments:             Interventions:  Medications   iopamidol (ISOVUE-370) solution 90 mL (90 mLs Intravenous $Given 23 1239)   sodium chloride 0.9 % bag 500mL for CT scan flush use (65 mLs Intravenous $Given 23 1239)        Assessments:  On reevaluation, the patient is feeling reassured.  She states that she is ready to go home.    Independent Interpretation (X-rays, CTs, rhythm strip):  None    Consultations/Discussion of Management or Tests:  None        Social Determinants of Health affecting care:   None    Disposition:  The patient was discharged to home.     Impression & Plan    CMS Diagnoses: None      Medical Decision Makin-year-old female with a history of a right temporomandibular joint replacement presents emergency department with a complaint of right-sided facial pain.  Patient reports that her joint replacement was in .  She is concerned for infection.  CT of the soft tissue does not show any signs of inflammation or infection.  Her lab work also does not show any signs of infection.  On exam patient does not have any trismus, she is able to open her mouth completely, and there are no abnormalities in her  oropharynx.  I have low suspicion for a deep space infection in her mouth or neck.  She does report that her anti-inflammatories have helped the pain.  I think that this is musculoskeletal. Patient will follow up with her surgeon at [place] as well as at her scheduled appointment with her ENT in a couple days.      Diagnosis:    ICD-10-CM    1. Right facial pain  R51.9            Discharge Medications:  Discharge Medication List as of 11/6/2023  5:31 PM             Inés Mendosa MD  11/6/2023   Inés Mendosa MD Richardson, Elizabeth, MD  11/08/23 [id number]

## 2023-11-06 NOTE — ED TRIAGE NOTES
Mid October, patient woke with blood in her mouth. Went to urgent care then, they were unable to help her. Has been dealing with right sided jaw pain since. Patient had a total jaw joint replacement in 21. Concerned for possible infection. Started amoxicillin last night

## 2023-11-06 NOTE — ED NOTES
"Tele-PIT/Intake Evaluation      Video-Visit Details    Type of service:  Video Visit    Video Start Time (time video started): 11:27 AM  Video End Time (time video stopped): 11:32 AM   Originating Location (pt. Location):  [facility]  Distant Location (provider location):  [facility]  Mode of Communication:  Video Conference via VISup  Patient verbally consented to [employer] televisit.    History:  Patient presents stating Oct 19 woke up with blood in her mouth. Since then saw dentist, pcp, urgent care.  \"No one could figure out where blood came from\".  Patient with total jaw joint replacement Jan 2022.  Dentist said \"teeth were fine\".  No fever.  Jaw and neck throb.  Dentist prescribed amoxicillin that she started yesterday.  No dental pain.  Swelling developed slowly over a week.  No injury.      Exam:  General:  Alert, interactive  Cardiovascular:  Well perfused  Lungs:  No respiratory distress, no accessory muscle use  Neuro:  Moving all 4 extremities  Skin:  Warm, dry  Psych:  Normal affect  Patient speaks in full sentences with no obvious trismus or definitive swelling that can be seen over camera  Patient Vitals for the past 24 hrs:   BP Temp Temp src Pulse Resp SpO2   11/06/23 1020 (!) 175/131 97.4  F (36.3  C) Temporal 90 20 100 %       Appropriate interventions for symptom management were initiated if applicable.  Appropriate diagnostic tests were initiated if indicated.    Important information for subsequent clinician:  Labs and CT maxillofacial and soft tissue neck are ordered while awaiting in person evaluation    I briefly evaluated the patient and developed an initial plan of care. I discussed this plan and explained that this brief interaction does not constitute a full evaluation. Patient/family understands that they should wait to be fully evaluated and discuss any test results with another clinician prior to leaving the hospital.       Nahomy eLe MD  11/06/23 1132    "

## 2023-11-08 ENCOUNTER — OFFICE VISIT (OUTPATIENT)
Dept: OPHTHALMOLOGY | Facility: CLINIC | Age: 73
End: 2023-11-08
Attending: STUDENT IN AN ORGANIZED HEALTH CARE EDUCATION/TRAINING PROGRAM
Payer: MEDICARE

## 2023-11-08 DIAGNOSIS — Z98.890 S/P LASIK (LASER ASSISTED IN SITU KERATOMILEUSIS): Primary | ICD-10-CM

## 2023-11-08 PROCEDURE — 99207 PR SERVICE NOT STAFFED W/SUPERV PROV: CPT | Performed by: STUDENT IN AN ORGANIZED HEALTH CARE EDUCATION/TRAINING PROGRAM

## 2023-11-08 PROCEDURE — 999N000103 HC STATISTIC NO CHARGE FACILITY FEE

## 2023-11-08 ASSESSMENT — REFRACTION_WEARINGRX
OD_ADD: +2.50
OD_AXIS: 178
OS_ADD: +2.50
OD_SPHERE: -1.25
OD_CYLINDER: +0.25
OS_SPHERE: -2.00
OS_CYLINDER: SPHERE

## 2023-11-08 ASSESSMENT — CONF VISUAL FIELD
OS_NORMAL: 1
METHOD: COUNTING FINGERS
OD_INFERIOR_TEMPORAL_RESTRICTION: 0
OS_SUPERIOR_TEMPORAL_RESTRICTION: 0
OS_SUPERIOR_NASAL_RESTRICTION: 0
OD_INFERIOR_NASAL_RESTRICTION: 0
OD_NORMAL: 1
OD_SUPERIOR_TEMPORAL_RESTRICTION: 0
OS_INFERIOR_NASAL_RESTRICTION: 0
OS_INFERIOR_TEMPORAL_RESTRICTION: 0
OD_SUPERIOR_NASAL_RESTRICTION: 0

## 2023-11-08 ASSESSMENT — REFRACTION_MANIFEST
OD_SPHERE: -1.25
OD_ADD: +2.50
OS_CYLINDER: +1.25
OS_AXIS: 005
OS_SPHERE: -2.00
OD_CYLINDER: +1.75
OS_ADD: +2.50
OD_AXIS: 020

## 2023-11-08 ASSESSMENT — VISUAL ACUITY
OS_CC+: -3
OD_CC+: -1
OS_CC: 20/25
OD_CC: 20/20
METHOD: SNELLEN - LINEAR

## 2023-11-08 NOTE — NURSING NOTE
Chief Complaints and History of Present Illnesses   Patient presents with    Follow Up     Chief Complaint(s) and History of Present Illness(es)       Follow Up              Laterality: both eyes    Associated symptoms: Negative for eye pain, flashes and floaters    Treatments tried: artificial tears              Comments    Here for follow up. VA is about the same. Old glasses does not seem to fully correct vision - has not updated from May yet. No flashes or floaters. No pain.    Kyrie Chairez COT 11:57 AM November 8, 2023

## 2023-11-08 NOTE — Clinical Note
Per our conversation. Script provided. Patient to RTC in 1 year or sooner PRN with testing. Please review and close. Thanks.

## 2023-11-09 ENCOUNTER — MEDICAL CORRESPONDENCE (OUTPATIENT)
Dept: HEALTH INFORMATION MANAGEMENT | Facility: CLINIC | Age: 73
End: 2023-11-09
Payer: MEDICARE

## 2023-11-10 ENCOUNTER — TRANSCRIBE ORDERS (OUTPATIENT)
Dept: OTHER | Age: 73
End: 2023-11-10

## 2023-11-10 DIAGNOSIS — M54.9 BACK PAIN: Primary | ICD-10-CM

## 2023-11-28 ENCOUNTER — ANCILLARY PROCEDURE (OUTPATIENT)
Dept: BONE DENSITY | Facility: CLINIC | Age: 73
End: 2023-11-28
Attending: HOSPITALIST
Payer: MEDICARE

## 2023-11-28 DIAGNOSIS — M81.0 AGE RELATED OSTEOPOROSIS, UNSPECIFIED PATHOLOGICAL FRACTURE PRESENCE: ICD-10-CM

## 2023-11-28 PROCEDURE — 77080 DXA BONE DENSITY AXIAL: CPT | Mod: TC | Performed by: PHYSICIAN ASSISTANT

## 2023-12-05 ENCOUNTER — TRANSFERRED RECORDS (OUTPATIENT)
Dept: HEALTH INFORMATION MANAGEMENT | Facility: CLINIC | Age: 73
End: 2023-12-05
Payer: MEDICARE

## 2023-12-11 ENCOUNTER — NURSE TRIAGE (OUTPATIENT)
Dept: INTERNAL MEDICINE | Facility: CLINIC | Age: 73
End: 2023-12-11
Payer: MEDICARE

## 2023-12-11 NOTE — TELEPHONE ENCOUNTER
"Nurse Triage SBAR    Is this a 2nd Level Triage? YES, LICENSED PRACTITIONER REVIEW IS REQUIRED    Situation: Pt has abdominal pain    Background: Pt has a hx of abdominal issues. This feels different.  Pt went out to eat on Friday- has a little diarrhea that night which pt reports it smelled terrible. Started feeling bloated and abdominal pain yesterday. Had a BM yesterday.     Assessment: +flatus, +pain 6/10- but does not interfere with daily activities. No nausea/ vomiting. Taking simethicone- helping a little    Protocol Recommended Disposition:   Be seen in the office today or tomorrow . Appt made for 12/12/23 at the [facility]    Recommendation: Okay to wait until 12/12/23 for appt?  Pt understands to seek ER care if her pain gets worse    Routed to provider    Thank you  Gerson Underwood RN on 12/11/2023 at 2:43 PM     Does the patient meet one of the following criteria for ADS visit consideration? 17+ years old, with an [facility] PCP       Reason for Disposition   MILD pain (e.g., does not interfere with normal activities) and pain comes and goes (cramps) lasts > 48 hours  (Exception: This same abdominal pain is a chronic symptom recurrent or ongoing AND present > 4 weeks.)    Answer Assessment - Initial Assessment Questions  1. LOCATION: \"Where does it hurt?\"       Middle and to the left, and into her back  2. RADIATION: \"Does the pain shoot anywhere else?\" (e.g., chest, back)      back  3. ONSET: \"When did the pain begin?\" (e.g., minutes, hours or days ago)       Went out to eat Friday- pain started on Saturday evening  4. SUDDEN: \"Gradual or sudden onset?\"      gradual  5. PATTERN \"Does the pain come and go, or is it constant?\"     - If it comes and goes: \"How long does it last?\" \"Do you have pain now?\"      (Note: Comes and goes means the pain is intermittent. It goes away completely between bouts.)     - If constant: \"Is it getting better, staying the same, or getting worse?\"       (Note: Constant " "means the pain never goes away completely; most serious pain is constant and gets worse.)       constant  6. SEVERITY: \"How bad is the pain?\"  (e.g., Scale 1-10; mild, moderate, or severe)     - MILD (1-3): Doesn't interfere with normal activities, abdomen soft and not tender to touch.      - MODERATE (4-7): Interferes with normal activities or awakens from sleep, abdomen tender to touch.      - SEVERE (8-10): Excruciating pain, doubled over, unable to do any normal activities.        6/10  7. RECURRENT SYMPTOM: \"Have you ever had this type of stomach pain before?\" If Yes, ask: \"When was the last time?\" and \"What happened that time?\"       no  8. CAUSE: \"What do you think is causing the stomach pain?\"      Going out to eat?  9. RELIEVING/AGGRAVATING FACTORS: \"What makes it better or worse?\" (e.g., antacids, bending or twisting motion, bowel movement)      Nothing.  10. OTHER SYMPTOMS: \"Do you have any other symptoms?\" (e.g., back pain, diarrhea, fever, urination pain, vomiting)        Back pain. Feels bloated.  11. PREGNANCY: \"Is there any chance you are pregnant?\" \"When was your last menstrual period?\"        NA    Protocols used: Abdominal Pain - Female-A-OH    "

## 2024-01-03 ENCOUNTER — OFFICE VISIT (OUTPATIENT)
Dept: ENDOCRINOLOGY | Facility: CLINIC | Age: 74
End: 2024-01-03
Attending: ORTHOPAEDIC SURGERY
Payer: MEDICARE

## 2024-01-03 VITALS
HEIGHT: 59 IN | OXYGEN SATURATION: 99 % | DIASTOLIC BLOOD PRESSURE: 83 MMHG | BODY MASS INDEX: 23.79 KG/M2 | WEIGHT: 118 LBS | HEART RATE: 83 BPM | SYSTOLIC BLOOD PRESSURE: 140 MMHG

## 2024-01-03 DIAGNOSIS — G89.29 CHRONIC MIDLINE LOW BACK PAIN WITH RIGHT-SIDED SCIATICA: Primary | ICD-10-CM

## 2024-01-03 DIAGNOSIS — M81.0 AGE-RELATED OSTEOPOROSIS WITHOUT CURRENT PATHOLOGICAL FRACTURE: ICD-10-CM

## 2024-01-03 DIAGNOSIS — M54.41 CHRONIC MIDLINE LOW BACK PAIN WITH RIGHT-SIDED SCIATICA: Primary | ICD-10-CM

## 2024-01-03 PROCEDURE — 99215 OFFICE O/P EST HI 40 MIN: CPT | Performed by: INTERNAL MEDICINE

## 2024-01-03 RX ORDER — DIPHENHYDRAMINE HYDROCHLORIDE 50 MG/ML
50 INJECTION INTRAMUSCULAR; INTRAVENOUS
Start: 2024-02-20

## 2024-01-03 RX ORDER — EPINEPHRINE 1 MG/ML
0.3 INJECTION, SOLUTION, CONCENTRATE INTRAVENOUS EVERY 5 MIN PRN
OUTPATIENT
Start: 2024-02-20

## 2024-01-03 RX ORDER — ALBUTEROL SULFATE 90 UG/1
1-2 AEROSOL, METERED RESPIRATORY (INHALATION)
Start: 2024-02-20

## 2024-01-03 RX ORDER — HEPARIN SODIUM (PORCINE) LOCK FLUSH IV SOLN 100 UNIT/ML 100 UNIT/ML
5 SOLUTION INTRAVENOUS
OUTPATIENT
Start: 2024-02-20

## 2024-01-03 RX ORDER — HEPARIN SODIUM,PORCINE 10 UNIT/ML
5-20 VIAL (ML) INTRAVENOUS DAILY PRN
OUTPATIENT
Start: 2024-02-20

## 2024-01-03 RX ORDER — ZOLEDRONIC ACID 5 MG/100ML
5 INJECTION, SOLUTION INTRAVENOUS ONCE
Start: 2024-02-20

## 2024-01-03 RX ORDER — METHYLPREDNISOLONE SODIUM SUCCINATE 125 MG/2ML
125 INJECTION, POWDER, LYOPHILIZED, FOR SOLUTION INTRAMUSCULAR; INTRAVENOUS
Start: 2024-02-20

## 2024-01-03 RX ORDER — ALBUTEROL SULFATE 0.83 MG/ML
2.5 SOLUTION RESPIRATORY (INHALATION)
OUTPATIENT
Start: 2024-02-20

## 2024-01-03 ASSESSMENT — PAIN SCALES - GENERAL: PAINLEVEL: MODERATE PAIN (4)

## 2024-01-03 NOTE — PATIENT INSTRUCTIONS
Reclast infusion in 2/2024    Chondroitin glucosamine    Continue Calcium continue      For scheduling osteoporosis treatment at the infusion center:      Please call a [facility] at the   - [facility]  [phone] (will connect to [facility])  - [facility]      Additional infusion centers are also at [facility], [facility], [place], [place], [place], [place], [place]  Infusion    [facility] [phone]   [place] [phone]   [place] [phone]   [place] [phone]   [place] [phone]   [place] [phone]   [facility] [phone]   [facility]: [phone]      Please call if therapy in an infusion center was discussed. This will trigger a prior authorization process.       Welcome to the [facility] Endocrinology and Diabetes Clinics     Our Endocrinology Clinics are here to provide you with a team-based, collaborative approach in the diagnosis and treatment of patients with diabetes and endocrine disorders. The team is made up of Physicians, Physician Assistants, Certified Diabetes Educators, Registered Nurses, Medical Assistants, Emergency Medical Technicians, and many others, all of whom have the unified goal of providing our patients with high quality care.     Please see below for some helpful tips to best navigate and use the [facility] Endocrinology clinic:     [place] Respect: At [facility], we are committed to a respectful and safe space for all patients, visitors, and staff.  We believe that mutual respect between patients and their care team is the foundation of quality care.  It is our expectation that you will be treated with respect by your care team.  In turn, we ask that all communication with the care team (written and verbal) be respectful and free from profanity, threatening, or abusive language.  Disrespectful communication undermines our therapeutic relationship with you and may  result in us being unable to continue to provide your care.    Refills: A provider must see you at least annually to prescribe and refill medications. This is to ensure your safety as well as meet insurance and compliance regulations.    Scheduling: Many of our Providers offer both in-person or video visits. Please call to schedule any needed follow ups as soon as possible because our provider schedules fill up very quickly. Our care team has the right to require an in-person visit when they believe that it is medically necessary.    Missed Appointments: If you need to cancel or miss your scheduled appointment, please call the clinic at [phone] to reschedule.  Please note if you repeatedly miss appointments or repeatedly miss appointments without calling to inform us ahead of time (no-show), the clinic may elect to not allow you to reschedule without speaking to a manager, may require a Partnership In Care Agreement prior to rescheduling, or could result in you no longer being able to receive care from the clinic. Providing the clinic with timely notification if you have to miss an appointment, allows us to better serve the needs of all of our patients.    Primary Care Provider: Our Endocrinologists are Specialists in their field. We expect you to have a Primary Care Provider established to handle any needs outside of your diabetes and endocrine care.  We would be happy to assist you find a Primary Care Provider, if you do not have one.    GRAVIDI: GRAVIDI is a wonderful resource that allows you access to your Care Team via online or the simone. Please ask a member of the team if you would like help creating an account. Please note that it may take up to 2 business days for a response. GRAVIDI messages are not reviewed on weekends or after business hours.  Emergent or urgent care needs should never be communicated via GRAVIDI.  If you experience a medical emergency call 911 or go to the nearest emergency  room.    Labs: It is recommended that you stay within the [facility] for labs but you are welcome to obtain ordered labs (with some exceptions) from any location of your choice as long as they are able to complete and process the needed labs. If you need us to fax orders to your preferred lab, please provide us the name and fax number of the lab you would like to go to so we can fax the orders. If your labs are drawn outside of the [facility], please have them fax the results to [phone] ([place]) or [phone] ([place]) or via [employer]. It is your responsibility to ensure that outside lab results are sent to us.    We look forward to working with you. Please do not hesitate to reach out with any questions.    Thank you,    The Endocrine Team    [facility] Address:   [place] Address:     [address] [place] [address]    Phone: [phone]  Fax: [phone]   [address] [place] [address]    Phone: [phone]  Fax: [phone]     [place] Cost Estimate Phone Number: [phone]    General Lab and Imaging Scheduling Phone Number: [phone]

## 2024-01-03 NOTE — LETTER
1/3/2024       RE: Marisa Vigil  [address]   [address]  [place] [facility] [place] [address]     Dear Colleague,    Thank you for referring your patient, Marisa Vigil, to the [facility] ENDOCRINOLOGY CLINIC [place] at [facility]. Please see a copy of my visit note below.        Endocrinology and Diabetes Clinic         Assessment:  Osteoporosis  Patient currently is seen at [place] this upcoming visit in November 2022 with Dr. Avila.  She has been on Reclast Prolia, alendronate and teriparatide, she developed side effects with all of them  Patient would like to keep this appointment, however there after possibly switch and be followed by me.  This would make sense particularly if patient would be on a treatment with multiple injections.    Hypothyroidism  Patient with some symptoms of hypothyroidism, however thyroid hormone levels have been in the normal range.  Continue for right now at L T4 88 mcg 6 days a week.  Would avoid overtreatment in this postmenopausal woman with osteoporosis    Multinodular goiter  On in office thyroid ultrasound today echo pattern shows with a small thyroid consistent with Hashimoto's thyroiditis, no nodules were present  Considering that patient had been followed for a number of years and had a prior biopsy this in itself is reassuring.  No further thyroid ultrasound would be indicated unless there is a significant clinical change.      Plan:   Follow-up with endocrinology for osteoporosis in November 2022  Overall patient can follow-up for hypothyroidism with her primary care provider, would continue on 88 mcg of levothyroxine which she takes 6 days a week and skips 1 day a week  No further thyroid ultrasound is indicated  If desired patient can follow-up with me in 6 months for osteoporosis    40 minutes spent on the date of the encounter doing chart review, review of test results, interpretation of tests, patient visit and  documentation     This note was generated using computer recognized voice recognition. This might result in some expected imperfection.    Simran Lazcano MD  Endocrinology and Diabetes  Telephone contact:  [facility] [place] [phone]  [facility] [place] [phone]           Interval history  1.5 year follow up    Patient had a very difficult last year  Patient notes that she had a Reclast infusion in December 2022, after that struggled to develop back pain,a bone lesion in T7, cholecystitis, severe GERD, questionable TIA with change in vision, question about hematemesis    Patient developed pain in early 2023 in her back, which was first bone pain and then pain in also the muscles.  This was very severe by February.  Now keeps her from being physically active.  She has more problems with GERD, the patient does not want to take PPIs as she is concerned about worsening her bone density  Patient awoke 1 morning with blood being on her pillow, she now receives a EGD for evaluation of upper GI bleed  Patient had cholecystitis  Patient was moving and heard a crack on October 26, had a slipped disc and is scheduled for steroid injection in January, struggling with sciatica    DEXA 11/28/23 T-score -2.8 at the right hip      Calcium supplement Calcium citrate twice daily  Vitamin D: with Calcium  Protein intake good  Physical activity: limited by pain, but overall very active      Hx of multiple nodules on thyroid. Repeat prior Endocrinologist from [place] (Dr. Mp Driver in [place], [place]) checked a US thyroid every 2 years, last was about 1 year ago. Interesting, there was not comment on patient's thyroid on recent CT neck on 12/2021.  In office US in 2022 shows small thyroid without distinct nodule    Hypothyroid since several years. Has been on 88 mcg of LT4 6 days a week, recent TSH is 1.72 on 5/30/23 Initially diagnosed with symptoms of cold intolerance.  Due to Hashimoto's  thyroiditis    Patient has been struggling with achiness all over her body.  Had problems with walking.  Had seen orthopedics, which did not reveal a clear diagnosis.  Has seen rheumatology without diagnosis.  Patient now is back to walking and is slowly improving    Thyroid medications: LT4 88 mcg 6 days a week, takes this in an a.m. on empty stomach with water food for 30 minutes      Current Problem List:   Patient Active Problem List   Diagnosis    Pulmonary nodules    Travel advice encounter    Thyroid nodule    Hypothyroidism, unspecified type    Age-related osteoporosis without current pathological fracture    TMJ (temporomandibular joint syndrome)    Gastroesophageal reflux disease without esophagitis    Breast pain, left    Elevated blood pressure reading without diagnosis of hypertension    Gastroesophageal reflux disease with esophagitis without hemorrhage    Back muscle spasm    Traveler's diarrhea    Skin lesion    Cataracts, both eyes    Chest pain    Dry eyes    Dyspareunia    Dysuria    History of abnormal cervical Papanicolaou smear    History of renal calculi    Leukorrhea    Malignant melanoma of skin of chest (H)    Menopausal syndrome    Non-toxic multinodular goiter    Osteoporosis    Other diseases of nasal cavity and sinuses(478.19)    Pain in female pelvis    Abscess of tonsil    Senile osteopenia    Sensorineural hearing loss (SNHL) of both ears    Swelling of breast    Urinary tract infectious disease    Uterine leiomyoma    Viral hepatitis A    AUDREY positive    TIA (transient ischemic attack)    Hyperlipidemia, unspecified hyperlipidemia type    Neck pain    Chronic midline low back pain with right-sided sciatica    Bilateral impacted cerumen    Bone lesion    Constipation, unspecified constipation type    Acute right-sided low back pain without sciatica    Encounter for screening mammogram for breast cancer    Primary hypertension    Epigastric pain               Past Medical and Past  Surgical History:  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     rigth left arytenoid cartilage, paramedian displacement of left true vocal cord    Cataract     Chronic osteoarthritis 2021, 22    Lots of tests    Gastroesophageal reflux disease without esophagitis     H/O degenerative disc disease     Hiatal hernia     Hyperlipidemia 2022    Jaw was repaired; eating again    Hypertension     Situational    Melanoma of skin (H)     s/p resection    Non-toxic multinodular goiter     Osteoarthritis     Pulmonary nodules     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 Saldaña, [facility]    LASIK  1998    LITHOTRIPSY  2007    ORTHOPEDIC SURGERY  2005;2007    L shoulder; R hip torn labrum    [place] BREAST AUGMENTATION  2014    [place] FACE LIFT      partial    REPAIR PTOSIS  2009    Partial face lift    SURGICAL PATHOLOGY EXAM      left hip lipoma excision    WISDOM TOOTH EXTRACTION  1969       Medications:   Current Outpatient Medications   Medication Sig Dispense Refill    aspirin 81 MG EC tablet Take 1 tablet (81 mg) by mouth daily      calcium citrate and vitamin D (CITRACAL) 200-250 MG-UNIT TABS per tablet Take 1 tablet by mouth 2 times daily      diclofenac (VOLTAREN) 50 MG EC tablet Take 1 tablet (50 mg) by mouth 2 times daily (Patient not taking: Reported  on 10/22/2023) 28 tablet 1    levothyroxine (SYNTHROID/LEVOTHROID) 88 MCG tablet Take 1 tablet (88 mcg) by mouth daily Every day but Sunday 90 tablet 3    magnesium hydroxide (MILK OF MAGNESIA) 400 MG/5ML suspension Take 15 mLs by mouth daily as needed for constipation or heartburn 118 mL 0    multivitamin w/minerals (THERA-VIT-M) tablet Take 1 tablet by mouth daily      omeprazole (PRILOSEC) 40 MG DR capsule Take 1 capsule (40 mg) by mouth daily as needed (heartburn) 90 capsule 2    polyethylene glycol (MIRALAX) 17 g packet Take 17 g by mouth daily as needed for constipation Take daily for 1 week to start, then as needed. (Patient not taking: Reported on 10/22/2023) 30 packet 1    rosuvastatin (CRESTOR) 20 MG tablet Take 1 tablet (20 mg) by mouth daily 90 tablet 3    SENNA-docusate sodium (SENNA S) 8.6-50 MG tablet Take 1 tablet by mouth 2 times daily (Patient not taking: Reported on 10/22/2023) 60 tablet 2       Allergies:   Allergies   Allergen Reactions    Clindamycin Blisters    Vancomycin Rash    Versed [Midazolam] Dizziness     Pt reports memory issues    Fosamax [Alendronate]      Heart burn    Parathyroid Hormone (Recomb) Other (See Comments)     Facial swelling on 3/2022       Social History     Tobacco Use    Smoking status: Never    Smokeless tobacco: Never    Tobacco comments:     Never used tobacco products   Substance Use Topics    Alcohol use: Yes     Alcohol/week: 1.0 standard drink of alcohol     Types: 1 Standard drinks or equivalent per week     Comment: Not often       Family History   Problem Relation Age of Onset    Breast Cancer Mother     Osteoporosis Mother     Cancer Mother         Breast cancer  early 50 s    Hyperlipidemia Mother     Arthritis Mother     Macular Degeneration Father     Glaucoma Father     Heart Disease Father         Heart issues & brain aneurysm    Atrial fibrillation Father     Hypertension Father     Osteoporosis Paternal Grandmother     Diabetes Brother          "Diabetes    Breast Cancer Other         Mother s Sister DX Jan 2022; returned summer 2023         Physical Examination:  BP (!) 140/83   Pulse 83   Ht 1.499 m (4' 11\")   Wt 53.5 kg (118 lb)   SpO2 99%   BMI 23.83 kg/m      Wt Readings from Last 4 Encounters:  07/19/22 : 54.7 kg (120 lb 11.2 oz)    General: Well appearing elderly petite woman in no distress, up and go quick     Labs and Studies:   Lab Results   Component Value Date     11/06/2023    CO2 25 11/06/2023    CHLORIDE 98 11/06/2023    CR 0.74 11/06/2023    TRIG 81 10/26/2023    HDL 87 10/26/2023    HGB 13.7 11/06/2023    TSH 1.73 05/30/2023    TSH 2.98 08/26/2022     Lab Results   Component Value Date    KALYAN 9.7 11/06/2023    KALYAN 10.1 08/17/2023    KALYAN 9.7 07/24/2023    KALYAN 9.8 05/30/2023    ALBUMIN 5.0 08/17/2023    ALT 47 08/17/2023    AST 42 08/17/2023    BILITOTAL 1.1 08/17/2023    CR 0.74 11/06/2023    CR 0.84 08/17/2023    CR 0.7 07/24/2023     11/06/2023    TSH 1.73 05/30/2023    ALKPHOS 57 08/17/2023    HGB 13.7 11/06/2023       Results for orders placed in visit on 11/28/23    DEXA HIP/PELVIS/SPINE - Future    Narrative  EXAM: DX HIP/PELVIS/SPINE  LOCATION: [facility]  DATE: 11/28/2023    INDICATION: BMD screening, follow-up. On Reclast.  DEMOGRAPHICS: Age- 73 years. Gender- Female. Menopausal status- Postmenopausal.  COMPARISON: No prior studies available on the current scanner.  TECHNIQUE: Dual-energy x-ray absorptiometry (DXA) performed with routine technique.    FINDINGS:    DXA RESULTS  -Lumbar Spine: L1-L4: BMD: 0.940 g/cm2. T-score: -2.1. Z-score: -0.3.  -RIGHT Hip Total: BMD: 0.740 g/cm2. T-score: -2.1. Z-score: -0.5.  -RIGHT Hip Femoral neck: BMD: 0.652 g/cm2. T-score: -2.8. Z-score: -1.0.  -LEFT Hip Total: BMD: 0.787 g/cm2. T-score: -1.8. Z-score: -0.1.  -LEFT Hip Femoral neck: BMD: 0.755 g/cm2. T-score: -2.0. Z-score: -0.2.    WHO T-SCORE CRITERIA  -Normal: T score at or above -1 SD  -Osteopenia: T " score between -1 and -2.5 SD  -Osteoporosis: T score at or below -2.5 SD    The World Health Organization (WHO) criteria is applicable to perimenopausal females, postmenopausal females, and men aged 50 years or older.    FRACTURE RISK  -The FRAX risk calculator is not applicable due to osteoporosis.    RECOMMENDATIONS  The patient's BMD is consistent with osteoporosis, and he/she is at increased fracture risk. If not currently being treated for low BMD, this would merit treatment according to the Bone Health and Osteoporosis Foundation.    Impression  IMPRESSION: OSTEOPOROSIS. T score meets the WHO criteria for osteoporosis at one or more measured sites. The risk of osteoporotic fracture increases approximately two-fold for each standard deviation decrease in T-score.      Results for orders placed during the hospital encounter of 02/15/23    NM Bone Scan Whole Body    Narrative  EXAM: NM BONE SCAN WHOLE BODY  LOCATION: [facility]  DATE/TIME: 2/15/2023 12:36 PM    INDICATION: Back pain. Thoracic vertebral body lesion.  COMPARISON: Thoracic spine MRI dated 01/24/2023  TECHNIQUE: 26.0 mCi technetium-99m MDP, IV. Anterior and posterior delayed whole-body images at 3 hours with additional spot images of the skull.    FINDINGS: Radiotracer uptake in a pattern typical of degenerative change involving the shoulders, spine, and ankle/mid feet, without suspicious areas of altered uptake to suggest an aggressive primary bone related process, specifically no focal  radiotracer uptake in the thoracic spine suggesting lesions on recent MRI represent benign vertebral body hemangiomas.    Impression  IMPRESSION:    Findings typical of degenerative change without suspicious areas of altered uptake to suggest an aggressive primary bone related process, specifically no focal radiotracer uptake in the thoracic spine suggesting the lesion seen on recent MRI represent  benign vertebral body  hemangiomas..        Results for orders placed during the hospital encounter of 11/01/23    MR Thoracic Spine w/o & w Contrast    Narrative  MR THORACIC SPINE WITH AND WITHOUT CONTRAST November 1, 2023 10:35 AM    INDICATION: Reevaluation of T8. Bone lesion.    TECHNIQUE: Noncontrast and contrast-enhanced MRI images of the  thoracic spine.  CONTRAST:  5 mL Gadavist.    COMPARISON: Thoracic spine MRI 6/10/2023, 1/24/2023. Nuclear medicine  bone scintigraphy 2/15/2023. Chest CT 5/3/2023.    FINDINGS: A T2 hyperintense enhancing lesion within the right aspect  of the posterior T7 vertebral body with extension into the posterior  elements is stable dating back to 1/24/2023. Similarly the smaller T1  hypointense round abnormality in the upper posterior right T8  vertebral body is also stable. Mild degenerative changes without  stenosis. Normal thoracic spinal cord. Normal vertebral body heights.    Impression  IMPRESSION: T7 and T8 vertebral body marrow abnormalities are  unchanged.    BRETT BLACK MD      SYSTEM ID:  [id number]    Results for orders placed in visit on 07/24/23    CT Abdomen Pelvis w Contrast    Narrative  EXAM: CT abdomen and pelvis with intravenous contrast. 7/24/2023 1:35  PM    HISTORY: Right abdominal pain- initially RUQ, now RLQ pain - tender on  exam over McBurney's point. Need to assess appendix, gallbladder,  nephrolithiasis; RUQ abdominal pain; RLQ abdominal pain    TECHNIQUE: Helical acquisition of image data was performed for the  abdomen and pelvis with intravenous contrast.    COMPARISON: None.    FINDINGS:    : Noncontributory    Lower thorax: No suspicious pulmonary nodules or masses. No focal  airspace consolidation. No pleural effusions. No pericardial effusion.  The esophagus appears unremarkable.    Liver: No intrahepatic biliary dilatation. Hypoattenuating,  subcentimeter lesion in the right hemiliver, too small to characterize  but likely represents benign  cysts.    Gallbladder/biliary tree: The common bile duct is not dilated.  Gallbladder appears unremarkable.    Pancreas: The pancreatic duct is not dilated. Parenchymal atrophy.    Spleen: The spleen is not enlarged.    Adrenal glands: No adrenal nodules.    Kidneys/ureters: No hydronephrosis. No renal calculi.    Bladder/pelvic organs: Unremarkable.    Bowel/mesentery: Stomach: Air distended stomach. Thickened pylorus and  without surrounding fat stranding.  No dilated loops of small bowel or  colon. The appendix is unremarkable.    Peritoneum/retroperitoneum: No extraluminal bowel gas. No free fluid  in the abdomen or pelvis.    Lymph nodes: No enlarged abdominal or pelvic lymph nodes by short axis  criteria.    Major vessels: Atherosclerotic calcifications.    Bones/soft tissues: Degenerative changes of the spine. No acute or  suspicious osseous abnormality. Partially imaged breast implants.    Impression  IMPRESSION:  Normal appendix and gallbladder. No renal stones. No acute pathology  to explain the patient's symptoms.    I have personally reviewed the examination and initial interpretation  and I agree with the findings.    WILFRID GUNN DO      SYSTEM ID:  [id number]                 Sincerely,    Simran Lazcano MD

## 2024-01-03 NOTE — PROGRESS NOTES
[facility]         Assessment:  Osteoporosis  Patient currently is seen at [facility] this upcoming visit in November 2022 with Dr. Avila.  She has been on Reclast Prolia, alendronate and teriparatide, she developed side effects with all of them  Patient would like to keep this appointment, however there after possibly switch and be followed by me.  This would make sense particularly if patient would be on a treatment with multiple injections.    Hypothyroidism  Patient with some symptoms of hypothyroidism, however thyroid hormone levels have been in the normal range.  Continue for right now at L T4 88 mcg 6 days a week.  Would avoid overtreatment in this postmenopausal woman with osteoporosis    Multinodular goiter  On in office thyroid ultrasound today echo pattern shows with a small thyroid consistent with Hashimoto's thyroiditis, no nodules were present  Considering that patient had been followed for a number of years and had a prior biopsy this in itself is reassuring.  No further thyroid ultrasound would be indicated unless there is a significant clinical change.      Plan:   Follow-up with endocrinology for osteoporosis in November 2022  Overall patient can follow-up for hypothyroidism with her primary care provider, would continue on 88 mcg of levothyroxine which she takes 6 days a week and skips 1 day a week  No further thyroid ultrasound is indicated  If desired patient can follow-up with me in 6 months for osteoporosis    40 minutes spent on the date of the encounter doing chart review, review of test results, interpretation of tests, patient visit and documentation     This note was generated using computer recognized voice recognition. This might result in some expected imperfection.    Simran Lazcano MD  Endocrinology and Diabetes  Telephone contact:  [facility] [phone]  [facility] [phone]           Interval  history  1.5 year follow up    Patient had a very difficult last year  Patient notes that she had a Reclast infusion in December 2022, after that struggled to develop back pain,a bone lesion in T7, cholecystitis, severe GERD, questionable TIA with change in vision, question about hematemesis    Patient developed pain in early 2023 in her back, which was first bone pain and then pain in also the muscles.  This was very severe by February.  Now keeps her from being physically active.  She has more problems with GERD, the patient does not want to take PPIs as she is concerned about worsening her bone density  Patient awoke 1 morning with blood being on her pillow, she now receives a EGD for evaluation of upper GI bleed  Patient had cholecystitis  Patient was moving and heard a crack on October 26, had a slipped disc and is scheduled for steroid injection in January, struggling with sciatica    DEXA 11/28/23 T-score -2.8 at the right hip      Calcium supplement Calcium citrate twice daily  Vitamin D: with Calcium  Protein intake good  Physical activity: limited by pain, but overall very active      Hx of multiple nodules on thyroid. Repeat prior Endocrinologist from [place] (Dr. Mp Driver in [place], [place]) checked a US thyroid every 2 years, last was about 1 year ago. Interesting, there was not comment on patient's thyroid on recent CT neck on 12/2021.  In office US in 2022 shows small thyroid without distinct nodule    Hypothyroid since several years. Has been on 88 mcg of LT4 6 days a week, recent TSH is 1.72 on 5/30/23 Initially diagnosed with symptoms of cold intolerance.  Due to Hashimoto's thyroiditis    Patient has been struggling with achiness all over her body.  Had problems with walking.  Had seen orthopedics, which did not reveal a clear diagnosis.  Has seen rheumatology without diagnosis.  Patient now is back to walking and is slowly improving    Thyroid medications: LT4 88 mcg 6 days a week, takes this in an  a.m. on empty stomach with water food for 30 minutes      Current Problem List:   Patient Active Problem List   Diagnosis    Pulmonary nodules    Travel advice encounter    Thyroid nodule    Hypothyroidism, unspecified type    Age-related osteoporosis without current pathological fracture    TMJ (temporomandibular joint syndrome)    Gastroesophageal reflux disease without esophagitis    Breast pain, left    Elevated blood pressure reading without diagnosis of hypertension    Gastroesophageal reflux disease with esophagitis without hemorrhage    Back muscle spasm    Traveler's diarrhea    Skin lesion    Cataracts, both eyes    Chest pain    Dry eyes    Dyspareunia    Dysuria    History of abnormal cervical Papanicolaou smear    History of renal calculi    Leukorrhea    Malignant melanoma of skin of chest (H)    Menopausal syndrome    Non-toxic multinodular goiter    Osteoporosis    Other diseases of nasal cavity and sinuses(478.19)    Pain in female pelvis    Abscess of tonsil    Senile osteopenia    Sensorineural hearing loss (SNHL) of both ears    Swelling of breast    Urinary tract infectious disease    Uterine leiomyoma    Viral hepatitis A    AUDREY positive    TIA (transient ischemic attack)    Hyperlipidemia, unspecified hyperlipidemia type    Neck pain    Chronic midline low back pain with right-sided sciatica    Bilateral impacted cerumen    Bone lesion    Constipation, unspecified constipation type    Acute right-sided low back pain without sciatica    Encounter for screening mammogram for breast cancer    Primary hypertension    Epigastric pain               Past Medical and Past Surgical History:  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     rigth left arytenoid cartilage, paramedian displacement of left true vocal cord    Cataract     Chronic osteoarthritis 2021, 22    Lots  of tests    Gastroesophageal reflux disease without esophagitis     H/O degenerative disc disease     Hiatal hernia     Hyperlipidemia 2022    Jaw was repaired; eating again    Hypertension     Situational    Melanoma of skin (H)     s/p resection    Non-toxic multinodular goiter     Osteoarthritis     Pulmonary nodules     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 Saldaña, [facility]    LASIK  1998    LITHOTRIPSY  2007    ORTHOPEDIC SURGERY  2005;2007    L shoulder; R hip torn labrum    ND BREAST AUGMENTATION  2014    ND FACE LIFT      partial    REPAIR PTOSIS  2009    Partial face lift    SURGICAL PATHOLOGY EXAM      left hip lipoma excision    WISDOM TOOTH EXTRACTION  1969       Medications:   Current Outpatient Medications   Medication Sig Dispense Refill    aspirin 81 MG EC tablet Take 1 tablet (81 mg) by mouth daily      calcium citrate and vitamin D (CITRACAL) 200-250 MG-UNIT TABS per tablet Take 1 tablet by mouth 2 times daily      diclofenac (VOLTAREN) 50 MG EC tablet Take 1 tablet (50 mg) by mouth 2 times daily (Patient not taking: Reported on 10/22/2023) 28 tablet 1    levothyroxine (SYNTHROID/LEVOTHROID) 88 MCG tablet Take 1 tablet (88 mcg) by mouth daily Every day but Sunday 90 tablet 3    magnesium hydroxide (MILK OF MAGNESIA) 400 MG/5ML suspension Take 15 mLs by mouth daily as needed for constipation or heartburn 118 mL 0    multivitamin w/minerals (THERA-VIT-M)  "tablet Take 1 tablet by mouth daily      omeprazole (PRILOSEC) 40 MG DR capsule Take 1 capsule (40 mg) by mouth daily as needed (heartburn) 90 capsule 2    polyethylene glycol (MIRALAX) 17 g packet Take 17 g by mouth daily as needed for constipation Take daily for 1 week to start, then as needed. (Patient not taking: Reported on 10/22/2023) 30 packet 1    rosuvastatin (CRESTOR) 20 MG tablet Take 1 tablet (20 mg) by mouth daily 90 tablet 3    SENNA-docusate sodium (SENNA S) 8.6-50 MG tablet Take 1 tablet by mouth 2 times daily (Patient not taking: Reported on 10/22/2023) 60 tablet 2       Allergies:   Allergies   Allergen Reactions    Clindamycin Blisters    Vancomycin Rash    Versed [Midazolam] Dizziness     Pt reports memory issues    Fosamax [Alendronate]      Heart burn    Parathyroid Hormone (Recomb) Other (See Comments)     Facial swelling on 3/2022       Social History     Tobacco Use    Smoking status: Never    Smokeless tobacco: Never    Tobacco comments:     Never used tobacco products   Substance Use Topics    Alcohol use: Yes     Alcohol/week: 1.0 standard drink of alcohol     Types: 1 Standard drinks or equivalent per week     Comment: Not often       Family History   Problem Relation Age of Onset    Breast Cancer Mother     Osteoporosis Mother     Cancer Mother         Breast cancer  early 50 s    Hyperlipidemia Mother     Arthritis Mother     Macular Degeneration Father     Glaucoma Father     Heart Disease Father         Heart issues & brain aneurysm    Atrial fibrillation Father     Hypertension Father     Osteoporosis Paternal Grandmother     Diabetes Brother         Diabetes    Breast Cancer Other         Mother s Sister DX Jan 2022; returned summer 2023         Physical Examination:  BP (!) 140/83   Pulse 83   Ht 1.499 m (4' 11\")   Wt 53.5 kg (118 lb)   SpO2 99%   BMI 23.83 kg/m      Wt Readings from Last 4 Encounters:  07/19/22 : 54.7 kg (120 lb 11.2 oz)    General: Well appearing elderly " petite woman in no distress, up and go quick     Labs and Studies:   Lab Results   Component Value Date     11/06/2023    CO2 25 11/06/2023    CHLORIDE 98 11/06/2023    CR 0.74 11/06/2023    TRIG 81 10/26/2023    HDL 87 10/26/2023    HGB 13.7 11/06/2023    TSH 1.73 05/30/2023    TSH 2.98 08/26/2022     Lab Results   Component Value Date    KALYAN 9.7 11/06/2023    KALYAN 10.1 08/17/2023    KALYAN 9.7 07/24/2023    KALYAN 9.8 05/30/2023    ALBUMIN 5.0 08/17/2023    ALT 47 08/17/2023    AST 42 08/17/2023    BILITOTAL 1.1 08/17/2023    CR 0.74 11/06/2023    CR 0.84 08/17/2023    CR 0.7 07/24/2023     11/06/2023    TSH 1.73 05/30/2023    ALKPHOS 57 08/17/2023    HGB 13.7 11/06/2023       Results for orders placed in visit on 11/28/23    DEXA HIP/PELVIS/SPINE - Future    Narrative  EXAM: DX HIP/PELVIS/SPINE  LOCATION: [facility]  DATE: 11/28/2023    INDICATION: BMD screening, follow-up. On Reclast.  DEMOGRAPHICS: Age- 73 years. Gender- Female. Menopausal status- Postmenopausal.  COMPARISON: No prior studies available on the current scanner.  TECHNIQUE: Dual-energy x-ray absorptiometry (DXA) performed with routine technique.    FINDINGS:    DXA RESULTS  -Lumbar Spine: L1-L4: BMD: 0.940 g/cm2. T-score: -2.1. Z-score: -0.3.  -RIGHT Hip Total: BMD: 0.740 g/cm2. T-score: -2.1. Z-score: -0.5.  -RIGHT Hip Femoral neck: BMD: 0.652 g/cm2. T-score: -2.8. Z-score: -1.0.  -LEFT Hip Total: BMD: 0.787 g/cm2. T-score: -1.8. Z-score: -0.1.  -LEFT Hip Femoral neck: BMD: 0.755 g/cm2. T-score: -2.0. Z-score: -0.2.    WHO T-SCORE CRITERIA  -Normal: T score at or above -1 SD  -Osteopenia: T score between -1 and -2.5 SD  -Osteoporosis: T score at or below -2.5 SD    The World Health Organization (WHO) criteria is applicable to perimenopausal females, postmenopausal females, and men aged 50 years or older.    FRACTURE RISK  -The FRAX risk calculator is not applicable due to osteoporosis.    RECOMMENDATIONS  The patient's  BMD is consistent with osteoporosis, and he/she is at increased fracture risk. If not currently being treated for low BMD, this would merit treatment according to the Bone Health and Osteoporosis Foundation.    Impression  IMPRESSION: OSTEOPOROSIS. T score meets the WHO criteria for osteoporosis at one or more measured sites. The risk of osteoporotic fracture increases approximately two-fold for each standard deviation decrease in T-score.      Results for orders placed during the hospital encounter of 02/15/23    NM Bone Scan Whole Body    Narrative  EXAM: NM BONE SCAN WHOLE BODY  LOCATION: [facility]  DATE/TIME: 2/15/2023 12:36 PM    INDICATION: Back pain. Thoracic vertebral body lesion.  COMPARISON: Thoracic spine MRI dated 01/24/2023  TECHNIQUE: 26.0 mCi technetium-99m MDP, IV. Anterior and posterior delayed whole-body images at 3 hours with additional spot images of the skull.    FINDINGS: Radiotracer uptake in a pattern typical of degenerative change involving the shoulders, spine, and ankle/mid feet, without suspicious areas of altered uptake to suggest an aggressive primary bone related process, specifically no focal  radiotracer uptake in the thoracic spine suggesting lesions on recent MRI represent benign vertebral body hemangiomas.    Impression  IMPRESSION:    Findings typical of degenerative change without suspicious areas of altered uptake to suggest an aggressive primary bone related process, specifically no focal radiotracer uptake in the thoracic spine suggesting the lesion seen on recent MRI represent  benign vertebral body hemangiomas..        Results for orders placed during the hospital encounter of 11/01/23    MR Thoracic Spine w/o & w Contrast    Narrative  MR THORACIC SPINE WITH AND WITHOUT CONTRAST November 1, 2023 10:35 AM    INDICATION: Reevaluation of T8. Bone lesion.    TECHNIQUE: Noncontrast and contrast-enhanced MRI images of the  thoracic spine.  CONTRAST:  5 mL  Gadavist.    COMPARISON: Thoracic spine MRI 6/10/2023, 1/24/2023. Nuclear medicine  bone scintigraphy 2/15/2023. Chest CT 5/3/2023.    FINDINGS: A T2 hyperintense enhancing lesion within the right aspect  of the posterior T7 vertebral body with extension into the posterior  elements is stable dating back to 1/24/2023. Similarly the smaller T1  hypointense round abnormality in the upper posterior right T8  vertebral body is also stable. Mild degenerative changes without  stenosis. Normal thoracic spinal cord. Normal vertebral body heights.    Impression  IMPRESSION: T7 and T8 vertebral body marrow abnormalities are  unchanged.    BRETT BLACK MD      SYSTEM ID:  [id number]    Results for orders placed in visit on 07/24/23    CT Abdomen Pelvis w Contrast    Narrative  EXAM: CT abdomen and pelvis with intravenous contrast. 7/24/2023 1:35  PM    HISTORY: Right abdominal pain- initially RUQ, now RLQ pain - tender on  exam over McBurney's point. Need to assess appendix, gallbladder,  nephrolithiasis; RUQ abdominal pain; RLQ abdominal pain    TECHNIQUE: Helical acquisition of image data was performed for the  abdomen and pelvis with intravenous contrast.    COMPARISON: None.    FINDINGS:    : Noncontributory    Lower thorax: No suspicious pulmonary nodules or masses. No focal  airspace consolidation. No pleural effusions. No pericardial effusion.  The esophagus appears unremarkable.    Liver: No intrahepatic biliary dilatation. Hypoattenuating,  subcentimeter lesion in the right hemiliver, too small to characterize  but likely represents benign cysts.    Gallbladder/biliary tree: The common bile duct is not dilated.  Gallbladder appears unremarkable.    Pancreas: The pancreatic duct is not dilated. Parenchymal atrophy.    Spleen: The spleen is not enlarged.    Adrenal glands: No adrenal nodules.    Kidneys/ureters: No hydronephrosis. No renal calculi.    Bladder/pelvic organs: Unremarkable.    Bowel/mesentery:  Stomach: Air distended stomach. Thickened pylorus and  without surrounding fat stranding.  No dilated loops of small bowel or  colon. The appendix is unremarkable.    Peritoneum/retroperitoneum: No extraluminal bowel gas. No free fluid  in the abdomen or pelvis.    Lymph nodes: No enlarged abdominal or pelvic lymph nodes by short axis  criteria.    Major vessels: Atherosclerotic calcifications.    Bones/soft tissues: Degenerative changes of the spine. No acute or  suspicious osseous abnormality. Partially imaged breast implants.    Impression  IMPRESSION:  Normal appendix and gallbladder. No renal stones. No acute pathology  to explain the patient's symptoms.    I have personally reviewed the examination and initial interpretation  and I agree with the findings.    WILFRID GUNN DO      SYSTEM ID:  [id number]

## 2024-03-12 ENCOUNTER — TRANSFERRED RECORDS (OUTPATIENT)
Dept: HEALTH INFORMATION MANAGEMENT | Facility: CLINIC | Age: 74
End: 2024-03-12
Payer: MEDICARE

## 2024-05-29 ENCOUNTER — TRANSFERRED RECORDS (OUTPATIENT)
Dept: HEALTH INFORMATION MANAGEMENT | Facility: CLINIC | Age: 74
End: 2024-05-29
Payer: MEDICARE

## 2024-06-12 ENCOUNTER — TRANSFERRED RECORDS (OUTPATIENT)
Dept: HEALTH INFORMATION MANAGEMENT | Facility: CLINIC | Age: 74
End: 2024-06-12
Payer: MEDICARE

## 2024-06-14 ENCOUNTER — TELEPHONE (OUTPATIENT)
Dept: OPHTHALMOLOGY | Facility: CLINIC | Age: 74
End: 2024-06-14
Payer: MEDICARE

## 2024-10-07 ENCOUNTER — HOSPITAL ENCOUNTER (OUTPATIENT)
Dept: MAMMOGRAPHY | Facility: CLINIC | Age: 74
Discharge: HOME | End: 2024-10-07
Attending: INTERNAL MEDICINE | Admitting: INTERNAL MEDICINE
Payer: MEDICARE

## 2024-10-07 DIAGNOSIS — Z12.31 VISIT FOR SCREENING MAMMOGRAM: ICD-10-CM

## 2024-10-07 PROCEDURE — 77063 BREAST TOMOSYNTHESIS BI: CPT

## 2024-10-14 ENCOUNTER — TELEPHONE (OUTPATIENT)
Dept: MAMMOGRAPHY | Facility: CLINIC | Age: 74
End: 2024-10-14
Payer: MEDICARE

## 2024-10-14 NOTE — TELEPHONE ENCOUNTER
Patient calling in to report new onset left breast pain that was 1st noticed after her screening mammogram.      She states she has given it 1 week and still has pain to the side of her breast when she lays down.  She is wondering if this could be related to to her implant.     She is wondering what the next steps are.    I have instructed her to discuss this with her primary MD.  After exam/consult and orders received we will scheduled the next imaging steps accordingly.

## 2025-04-06 ENCOUNTER — HEALTH MAINTENANCE LETTER (OUTPATIENT)
Age: 75
End: 2025-04-06

## (undated) RX ORDER — DIPHENHYDRAMINE HYDROCHLORIDE 50 MG/ML
INJECTION INTRAMUSCULAR; INTRAVENOUS
Status: DISPENSED
Start: 2023-07-06

## (undated) RX ORDER — FENTANYL CITRATE 50 UG/ML
INJECTION, SOLUTION INTRAMUSCULAR; INTRAVENOUS
Status: DISPENSED
Start: 2023-07-06

## (undated) RX ORDER — SODIUM CHLORIDE 9 MG/ML
INJECTION, SOLUTION INTRAVENOUS
Status: DISPENSED
Start: 2023-07-06

## (undated) RX ORDER — TRIAMCINOLONE ACETONIDE 40 MG/ML
INJECTION, SUSPENSION INTRA-ARTICULAR; INTRAMUSCULAR
Status: DISPENSED
Start: 2023-06-23

## (undated) RX ORDER — LIDOCAINE HYDROCHLORIDE 10 MG/ML
INJECTION, SOLUTION EPIDURAL; INFILTRATION; INTRACAUDAL; PERINEURAL
Status: DISPENSED
Start: 2023-06-23

## (undated) RX ORDER — LIDOCAINE HYDROCHLORIDE 10 MG/ML
INJECTION, SOLUTION EPIDURAL; INFILTRATION; INTRACAUDAL; PERINEURAL
Status: DISPENSED
Start: 2023-07-06